# Patient Record
Sex: FEMALE | Race: WHITE | NOT HISPANIC OR LATINO | Employment: UNEMPLOYED | URBAN - METROPOLITAN AREA
[De-identification: names, ages, dates, MRNs, and addresses within clinical notes are randomized per-mention and may not be internally consistent; named-entity substitution may affect disease eponyms.]

---

## 2017-02-07 ENCOUNTER — HOSPITAL ENCOUNTER (OUTPATIENT)
Dept: LAB | Facility: HOSPITAL | Age: 22
Setting detail: SPECIMEN
Discharge: HOME | End: 2017-02-07
Attending: NURSE PRACTITIONER | Admitting: NURSE PRACTITIONER

## 2017-02-07 LAB
ALBUMIN SERPL-MCNC: 3.9 G/DL (ref 3.5–4.8)
ALBUMIN/GLOB SERPL: 1.3 {RATIO} (ref 1–1.7)
ALP SERPL-CCNC: 94 IU/L (ref 32–91)
ALT SERPL-CCNC: 18 IU/L (ref 14–54)
ANION GAP SERPL CALC-SCNC: 11.5 MMOL/L (ref 10–20)
AST SERPL-CCNC: 23 IU/L (ref 15–41)
BILIRUB SERPL-MCNC: 0.4 MG/DL (ref 0.3–1.2)
BUN SERPL-MCNC: 6 MG/DL (ref 8–20)
BUN/CREAT SERPL: 12 (ref 5.4–26.2)
CALCIUM SERPL-MCNC: 9.3 MG/DL (ref 8.9–10.3)
CHLORIDE SERPL-SCNC: 102 MMOL/L (ref 101–111)
CONV CO2: 27 MMOL/L (ref 22–32)
CONV TOTAL PROTEIN: 7 G/DL (ref 6.1–7.9)
CREAT UR-MCNC: 0.5 MG/DL (ref 0.4–1)
GLOBULIN UR ELPH-MCNC: 3.1 G/DL (ref 2.5–3.8)
GLUCOSE SERPL-MCNC: 200 MG/DL (ref 65–99)
POTASSIUM SERPL-SCNC: 3.5 MMOL/L (ref 3.6–5.1)
SODIUM SERPL-SCNC: 137 MMOL/L (ref 136–144)

## 2018-03-22 ENCOUNTER — HOSPITAL ENCOUNTER (OUTPATIENT)
Dept: LAB | Facility: HOSPITAL | Age: 23
Setting detail: SPECIMEN
Discharge: HOME | End: 2018-03-22
Attending: INTERNAL MEDICINE | Admitting: INTERNAL MEDICINE

## 2018-07-25 ENCOUNTER — HOSPITAL ENCOUNTER (OUTPATIENT)
Dept: LAB | Facility: HOSPITAL | Age: 23
Setting detail: SPECIMEN
Discharge: HOME | End: 2018-07-25
Attending: NURSE PRACTITIONER | Admitting: NURSE PRACTITIONER

## 2018-07-26 LAB
ALBUMIN SERPL-MCNC: 3.5 G/DL (ref 3.5–4.8)
ALBUMIN/GLOB SERPL: 1.1 {RATIO} (ref 1–1.7)
ALP SERPL-CCNC: 75 IU/L (ref 32–91)
ALT SERPL-CCNC: 10 IU/L (ref 14–54)
ANION GAP SERPL CALC-SCNC: 9.4 MMOL/L (ref 10–20)
AST SERPL-CCNC: 15 IU/L (ref 15–41)
BILIRUB SERPL-MCNC: 0.5 MG/DL (ref 0.3–1.2)
BUN SERPL-MCNC: 9 MG/DL (ref 8–20)
BUN/CREAT SERPL: 12.9 (ref 5.4–26.2)
CALCIUM SERPL-MCNC: 8.7 MG/DL (ref 8.9–10.3)
CHLORIDE SERPL-SCNC: 100 MMOL/L (ref 101–111)
CONV CO2: 28 MMOL/L (ref 22–32)
CONV TOTAL PROTEIN: 6.7 G/DL (ref 6.1–7.9)
CREAT UR-MCNC: 0.7 MG/DL (ref 0.4–1)
GLOBULIN UR ELPH-MCNC: 3.2 G/DL (ref 2.5–3.8)
GLUCOSE SERPL-MCNC: ABNORMAL MG/DL (ref 65–99)
POTASSIUM SERPL-SCNC: 4.4 MMOL/L (ref 3.6–5.1)
SODIUM SERPL-SCNC: 133 MMOL/L (ref 136–144)

## 2019-07-23 ENCOUNTER — TELEPHONE (OUTPATIENT)
Dept: ENDOCRINOLOGY | Facility: CLINIC | Age: 24
End: 2019-07-23

## 2019-07-23 NOTE — TELEPHONE ENCOUNTER
Patient last seen in January 22, 2019, was instructed to arrange follow-up in 3 months.  Has not seen this patient since then.  On that visit she did not bring any blood sugar records and her last lab I have is from July 2018 when her A1c was 10.7% and serum glucose was 590 and CMP.  At this time I do not have enough information to provide any kind of clearance guidance with regards to surgery.  She will need follow-up, she needs to bring blood sugar records and will need A1c, CMP before we can assess her.  Thank you

## 2019-07-23 NOTE — TELEPHONE ENCOUNTER
"Pt's dentist is requesting medical clearance. The fax states to \"please consider the following treatment plan: 23 year old white female with muliple carious teeth requiring extraction. Patient would like to have IV sedation for the procedure. I would like to know if she is under good control of her diabetes prior to surgery. Normally a patient is NPO 8 hours prior to surgery. We ask them not tot take their insulin that morning until we check their blood sugar prior to surgery. If necessary, patient can use a sliding scale if the blood sugar is too high. Please advise if there are any contraindications to our proposed procedure. \"    I have printed the patient's most recent office visit (01/2019) along with med list and most recent a1c, from [facility], and have placed them on your desk.  "

## 2019-08-22 RX ORDER — INSULIN GLARGINE 100 [IU]/ML
INJECTION, SOLUTION SUBCUTANEOUS
Qty: 15 ML | Refills: 3 | Status: SHIPPED | OUTPATIENT
Start: 2019-08-22 | End: 2020-10-29 | Stop reason: SDUPTHER

## 2019-11-06 PROBLEM — E11.42 DIABETIC PERIPHERAL NEUROPATHY (HCC): Status: ACTIVE | Noted: 2017-01-09

## 2019-11-06 PROBLEM — Z91.199 PATIENT'S NONCOMPLIANCE WITH OTHER MEDICAL TREATMENT AND REGIMEN: Status: ACTIVE | Noted: 2017-01-09

## 2019-11-06 PROBLEM — Z83.3 FAMILY HISTORY OF DIABETES MELLITUS: Status: ACTIVE | Noted: 2019-11-06

## 2019-11-06 RX ORDER — LANCETS
EACH MISCELLANEOUS
COMMUNITY
Start: 2019-01-22 | End: 2020-12-14 | Stop reason: SDUPTHER

## 2019-11-11 ENCOUNTER — TELEPHONE (OUTPATIENT)
Dept: ENDOCRINOLOGY | Facility: CLINIC | Age: 24
End: 2019-11-11

## 2020-06-24 RX ORDER — INSULIN ASPART 100 [IU]/ML
INJECTION, SOLUTION INTRAVENOUS; SUBCUTANEOUS
Refills: 4 | OUTPATIENT
Start: 2020-06-24

## 2020-06-25 RX ORDER — INSULIN ASPART 100 [IU]/ML
INJECTION, SOLUTION INTRAVENOUS; SUBCUTANEOUS
Refills: 4 | OUTPATIENT
Start: 2020-06-25

## 2020-10-29 ENCOUNTER — LAB (OUTPATIENT)
Dept: LAB | Facility: HOSPITAL | Age: 25
End: 2020-10-29

## 2020-10-29 ENCOUNTER — OFFICE VISIT (OUTPATIENT)
Dept: ENDOCRINOLOGY | Facility: CLINIC | Age: 25
End: 2020-10-29

## 2020-10-29 VITALS
HEIGHT: 59 IN | TEMPERATURE: 97.1 F | SYSTOLIC BLOOD PRESSURE: 100 MMHG | WEIGHT: 83 LBS | BODY MASS INDEX: 16.73 KG/M2 | DIASTOLIC BLOOD PRESSURE: 65 MMHG | HEART RATE: 117 BPM | OXYGEN SATURATION: 97 %

## 2020-10-29 DIAGNOSIS — E10.65 TYPE 1 DIABETES MELLITUS WITH HYPERGLYCEMIA (HCC): Primary | ICD-10-CM

## 2020-10-29 DIAGNOSIS — E10.65 TYPE 1 DIABETES MELLITUS WITH HYPERGLYCEMIA (HCC): ICD-10-CM

## 2020-10-29 DIAGNOSIS — E11.42 DIABETIC PERIPHERAL NEUROPATHY (HCC): ICD-10-CM

## 2020-10-29 DIAGNOSIS — Z91.199 PATIENT'S NONCOMPLIANCE WITH OTHER MEDICAL TREATMENT AND REGIMEN: ICD-10-CM

## 2020-10-29 DIAGNOSIS — R73.9 HYPERGLYCEMIA: ICD-10-CM

## 2020-10-29 LAB
ALBUMIN SERPL-MCNC: 3.9 G/DL (ref 3.5–5.2)
ALBUMIN/GLOB SERPL: 1.3 G/DL
ALP SERPL-CCNC: 77 U/L (ref 39–117)
ALT SERPL W P-5'-P-CCNC: 11 U/L (ref 1–33)
ANION GAP SERPL CALCULATED.3IONS-SCNC: 9.7 MMOL/L (ref 5–15)
AST SERPL-CCNC: 13 U/L (ref 1–32)
BILIRUB SERPL-MCNC: 0.7 MG/DL (ref 0–1.2)
BUN SERPL-MCNC: 12 MG/DL (ref 6–20)
BUN/CREAT SERPL: 16.7 (ref 7–25)
CALCIUM SPEC-SCNC: 9.2 MG/DL (ref 8.6–10.5)
CHLORIDE SERPL-SCNC: 91 MMOL/L (ref 98–107)
CHOLEST SERPL-MCNC: 113 MG/DL (ref 0–200)
CO2 SERPL-SCNC: 23.3 MMOL/L (ref 22–29)
CREAT SERPL-MCNC: 0.72 MG/DL (ref 0.57–1)
GFR SERPL CREATININE-BSD FRML MDRD: 100 ML/MIN/1.73
GLOBULIN UR ELPH-MCNC: 2.9 GM/DL
GLUCOSE BLDC GLUCOMTR-MCNC: 0 MG/DL (ref 70–130)
GLUCOSE BLDC GLUCOMTR-MCNC: >500 MG/DL (ref 70–105)
GLUCOSE SERPL-MCNC: 739 MG/DL (ref 65–99)
HBA1C MFR BLD: 11.3 % (ref 3.5–5.6)
HDLC SERPL-MCNC: 42 MG/DL (ref 40–60)
LDLC SERPL CALC-MCNC: 48 MG/DL (ref 0–100)
LDLC/HDLC SERPL: 1.06 {RATIO}
POTASSIUM SERPL-SCNC: 4.8 MMOL/L (ref 3.5–5.2)
PROT SERPL-MCNC: 6.8 G/DL (ref 6–8.5)
SODIUM SERPL-SCNC: 124 MMOL/L (ref 136–145)
TRIGL SERPL-MCNC: 133 MG/DL (ref 0–150)
TSH SERPL DL<=0.05 MIU/L-ACNC: 4.2 UIU/ML (ref 0.27–4.2)
VLDLC SERPL-MCNC: 23 MG/DL (ref 5–40)

## 2020-10-29 PROCEDURE — 82962 GLUCOSE BLOOD TEST: CPT | Performed by: INTERNAL MEDICINE

## 2020-10-29 PROCEDURE — 80053 COMPREHEN METABOLIC PANEL: CPT

## 2020-10-29 PROCEDURE — 84443 ASSAY THYROID STIM HORMONE: CPT

## 2020-10-29 PROCEDURE — 83036 HEMOGLOBIN GLYCOSYLATED A1C: CPT

## 2020-10-29 PROCEDURE — 36415 COLL VENOUS BLD VENIPUNCTURE: CPT

## 2020-10-29 PROCEDURE — 80061 LIPID PANEL: CPT

## 2020-10-29 PROCEDURE — 99214 OFFICE O/P EST MOD 30 MIN: CPT | Performed by: INTERNAL MEDICINE

## 2020-10-29 RX ORDER — MAGNESIUM 200 MG
1000 TABLET ORAL DAILY
Qty: 100 EACH | Refills: 4 | Status: SHIPPED | OUTPATIENT
Start: 2020-10-29 | End: 2020-12-14

## 2020-10-29 RX ORDER — ERGOCALCIFEROL (VITAMIN D2) 50 MCG
2000 CAPSULE ORAL DAILY
Qty: 100 CAPSULE | Refills: 4 | Status: SHIPPED | OUTPATIENT
Start: 2020-10-29 | End: 2020-12-14

## 2020-10-29 RX ORDER — IBUPROFEN 600 MG/1
1 TABLET ORAL ONCE AS NEEDED
Qty: 2 KIT | Refills: 6 | Status: SHIPPED | OUTPATIENT
Start: 2020-10-29 | End: 2022-02-01 | Stop reason: SDUPTHER

## 2020-10-29 RX ORDER — FLUTICASONE PROPIONATE 50 MCG
SPRAY, SUSPENSION (ML) NASAL
COMMUNITY
Start: 2020-07-24 | End: 2020-10-29

## 2020-10-29 RX ORDER — AMOXICILLIN AND CLAVULANATE POTASSIUM 250; 62.5 MG/5ML; MG/5ML
POWDER, FOR SUSPENSION ORAL
COMMUNITY
Start: 2020-07-24 | End: 2020-10-29

## 2020-10-29 RX ORDER — INSULIN GLARGINE 100 [IU]/ML
15 INJECTION, SOLUTION SUBCUTANEOUS NIGHTLY
Qty: 15 ML | Refills: 3 | Status: SHIPPED | OUTPATIENT
Start: 2020-10-29 | End: 2020-12-14 | Stop reason: SDUPTHER

## 2020-10-29 RX ORDER — INSULIN LISPRO 100 U/ML
INJECTION, SOLUTION SUBCUTANEOUS
Qty: 5 PEN | Refills: 6 | Status: SHIPPED | OUTPATIENT
Start: 2020-10-29 | End: 2021-10-25 | Stop reason: SDUPTHER

## 2020-10-29 NOTE — PATIENT INSTRUCTIONS
Change Basaglar to 15 units subcu nightly  Start Admelog 4 units with each meal  Always keep glucose source in case of low blood sugar  Start vitamin B12 1000 mcg sublingual daily  Labs today  Annual eye exam  Please keep your appointments  Follow-up in 3 months.

## 2020-10-29 NOTE — PROGRESS NOTES
Endocrine Progress Note Outpatient     Patient Care Team:  Antoinette Mcmahon MD as PCP - General    Chief Complaint: Follow-up type 1 diabetes: She is accompanied today with her mother.    HPI: 25-year-old female with history of type 1 diabetes for last 6 to 7 years, last seen in January 2019.  She is now here for follow-up because she ran out of her one of her insulin.  She tells me that she is taking Basaglar 20 units at bedtime and NovoLog on a sliding scale between 5 to 10 units with each meal but she has not taken NovoLog for the last couple of weeks.  She tells me that she checks blood sugars at least 3 times a day and runs high most of the time but she occasionally does have low blood sugar.  This last seen in January 2019 she has not been hospitalized for high or low blood sugars.  Unfortunately I do not have any blood sugar records for review and she has not done any labs.  She tells me that she has done diabetes education in the past but she does not know when it was maybe about a year ago or so.  She tells me that she does have glucose source in case of low blood sugar.    Diabetic neuropathy: She does have some burning in the tingling of her feet which is not taking any diabetic neuropathy medications at this time.    Past Medical History:   Diagnosis Date   • Diabetes mellitus type I (CMS/[facility])        Social History     Socioeconomic History   • Marital status: Single     Spouse name: Not on file   • Number of children: Not on file   • Years of education: Not on file   • Highest education level: Not on file   Tobacco Use   • Smoking status: Never Smoker   • Smokeless tobacco: Never Used   Substance and Sexual Activity   • Alcohol use: Never     Frequency: Never   • Drug use: Defer       History reviewed. No pertinent family history.    No Known Allergies    ROS:   Constitutional:  Denies fatigue, tiredness.    Eyes:  Denies change in visual acuity   HENT:  Denies nasal congestion or sore throat    Respiratory: denies cough, shortness of breath.   Cardiovascular:  denies chest pain, edema   GI:  Denies abdominal pain, nausea, vomiting.   Musculoskeletal:  Denies back pain or joint pain   Integument:  Denies dry skin and rash   Neurologic:  Denies headache, focal weakness or sensory changes   Endocrine:  Denies polyuria or polydipsia   Psychiatric:  Denies depression or anxiety      Vitals:    10/29/20 1134   BP: 100/65   Pulse: 117   Temp: 97.1 °F (36.2 °C)   SpO2: 97%       Physical Exam:  GEN: NAD, conversant  EYES: EOMI, PERRL, no conjunctival erythema  NECK: no thyromegaly, full ROM   CV: RRR, no murmurs/rubs/gallops, no peripheral edema  LUNG: CTAB, no wheezes/rales/ronchi  SKIN: no rashes, no acanthosis  MSK: no deformities, full ROM of all extremities  NEURO: no tremors, DTR normal  PSYCH: AOX3, appropriate mood, affect normal      Results Review:     I reviewed the patient's new clinical results.      Lab Results   Component Value Date    GLUCOSE (C) 07/25/2018     590 Result checked, called, and read back. ELDON (KEDAR) [id number].    BUN 9 07/25/2018    CREATININE 0.7 07/25/2018    BCR 12.9 07/25/2018    K 4.4 07/25/2018    CO2 28 07/25/2018    CALCIUM 8.7 (L) 07/25/2018    ALBUMIN 3.5 07/25/2018    LABIL2 1.1 07/25/2018    AST 15 07/25/2018    ALT 10 (L) 07/25/2018         Medication Review: Reviewed.       Current Outpatient Medications:   •  ACCU-CHEK FASTCLIX LANCETS misc, ACCU-CHEK FASTCLIX LANCETS, Disp: , Rfl:   •  glucagon (GLUCAGON EMERGENCY) 1 MG injection, GLUCAGON EMERGENCY 1 MG KIT, Disp: , Rfl:   •  glucose blood (ACCU-CHEK JULIAN PLUS) test strip, ACCU-CHEK JULIAN PLUS STRP, Disp: , Rfl:   •  Insulin Glargine (BASAGLAR KWIKPEN) 100 UNIT/ML injection pen, Inject 20 units at bedtime, Disp: 15 mL, Rfl: 3  •  Insulin Pen Needle (B-D UF III MINI PEN NEEDLES) 31G X 5 MM misc, BD PEN NEEDLE MINI U/F 31G X 5 MM, Disp: , Rfl:   •  insulin aspart (NOVOLOG FLEXPEN) 100 UNIT/ML solution  pen-injector sc pen, NOVOLOG FLEXPEN 100 UNIT/ML SOPN, Disp: , Rfl:       Assessment/Plan   1.  Diabetes mellitus type 1: Uncontrolled with significant hyperglycemia.  At this time I will add Admelog about 5 units with each meal and she will continue Basaglar but I will reduce the dose to 15 units subcu daily.  She is advised to check blood sugars at least 3 times a day and bring records for review.  She is also advised to always keep glucose source in case of low blood sugar and keep appointments.  She is accompanied today with her mother.  2.  Diabetic peripheral neuropathy: We will add vitamin B12 supplementation  3.  Noncompliance: She understands diabetes complications.            Landon De Luna MD FACE.

## 2020-10-30 ENCOUNTER — TELEPHONE (OUTPATIENT)
Dept: ENDOCRINOLOGY | Facility: CLINIC | Age: 25
End: 2020-10-30

## 2020-10-30 NOTE — TELEPHONE ENCOUNTER
Got called from the hospital yesterday with regards to high blood sugars, chart reviewed.  Called patient on October 29, 2020 in the evening.  No answer and message left for her to call back.

## 2020-11-06 DIAGNOSIS — E10.65 TYPE 1 DIABETES MELLITUS WITH HYPERGLYCEMIA (HCC): Primary | ICD-10-CM

## 2020-11-09 RX ORDER — LEVOTHYROXINE SODIUM 0.03 MG/1
25 TABLET ORAL DAILY
Qty: 30 TABLET | Refills: 3 | Status: SHIPPED | OUTPATIENT
Start: 2020-11-09 | End: 2021-03-10 | Stop reason: SDUPTHER

## 2020-11-11 DIAGNOSIS — Z91.199 PATIENT'S NONCOMPLIANCE WITH OTHER MEDICAL TREATMENT AND REGIMEN: Primary | ICD-10-CM

## 2020-11-11 DIAGNOSIS — E10.65 TYPE 1 DIABETES MELLITUS WITH HYPERGLYCEMIA (HCC): ICD-10-CM

## 2020-11-12 NOTE — TELEPHONE ENCOUNTER
Attempted to call both phone numbers, no answer.  
Patient notified. She has not been in contact with insurance to see what is covered instead of Glucagon. Advised patient it is important to have that on hand and to please call me as soon as she knows so we can get it sent to the pharmacy. Pt states she cannot take pills, so unsure of how to proceed with levothyroxine as I do not think it comes in liquid form. Please advise. Lab orders will be mailed to patient and Kathrine will schedule follow up appointment.   
Patient will contact insurance to see what is covered, pharmacy did not know. Patient also wanted you to know she is 6 weeks pregnant and wants to know if she should do anything differently.   
Per [facility], Glucagon is not covered by insurance, please advise.  
Plz see if what insurance will cover?   
She can crush levothyroxine pills and take it in applesauce or something.  
She has uncontrolled type 1 diabetes with very high blood sugars.  She is high risk pregnancy with high risk for fetal malformations and miscarriage.  She needs to see high risk pregnancy people right away.  She needs to take her insulins on regular basis.  She needs to check her blood sugars at least 4 times a day fasting in the morning and 2 hours after each meal and send blood sugar weekly for review.  She needs to keep glucose source in case of low blood sugar she needs to follow diet.    Also her TSH is high normal, I will add levothyroxine 25 mcg p.o. daily.    Check TSH and free T4 along with A1c in 4 weeks and follow-up after labs.    Most importantly needs to follow-up with high risk pregnancy right away.  
Spoke to a family member, patient was asleep but they took number and will have her call when she wakes up.  
vertigo, gastro/Medications

## 2020-12-14 ENCOUNTER — OFFICE VISIT (OUTPATIENT)
Dept: ENDOCRINOLOGY | Facility: CLINIC | Age: 25
End: 2020-12-14

## 2020-12-14 VITALS
WEIGHT: 91 LBS | DIASTOLIC BLOOD PRESSURE: 65 MMHG | OXYGEN SATURATION: 98 % | BODY MASS INDEX: 18.35 KG/M2 | HEART RATE: 118 BPM | HEIGHT: 59 IN | TEMPERATURE: 97.1 F | SYSTOLIC BLOOD PRESSURE: 100 MMHG

## 2020-12-14 DIAGNOSIS — E03.9 ACQUIRED HYPOTHYROIDISM: ICD-10-CM

## 2020-12-14 DIAGNOSIS — E10.65 TYPE 1 DIABETES MELLITUS WITH HYPERGLYCEMIA (HCC): Primary | ICD-10-CM

## 2020-12-14 DIAGNOSIS — E11.42 DIABETIC PERIPHERAL NEUROPATHY (HCC): ICD-10-CM

## 2020-12-14 DIAGNOSIS — Z91.199 PATIENT'S NONCOMPLIANCE WITH OTHER MEDICAL TREATMENT AND REGIMEN: ICD-10-CM

## 2020-12-14 LAB — GLUCOSE BLDC GLUCOMTR-MCNC: 56 MG/DL (ref 70–105)

## 2020-12-14 PROCEDURE — 99214 OFFICE O/P EST MOD 30 MIN: CPT | Performed by: INTERNAL MEDICINE

## 2020-12-14 PROCEDURE — 82962 GLUCOSE BLOOD TEST: CPT | Performed by: INTERNAL MEDICINE

## 2020-12-14 RX ORDER — LANCETS
EACH MISCELLANEOUS
Qty: 100 EACH | Refills: 6 | Status: SHIPPED | OUTPATIENT
Start: 2020-12-14 | End: 2021-11-24 | Stop reason: SDUPTHER

## 2020-12-14 RX ORDER — INSULIN GLARGINE 100 [IU]/ML
13 INJECTION, SOLUTION SUBCUTANEOUS NIGHTLY
Qty: 15 ML | Refills: 3 | Status: SHIPPED | OUTPATIENT
Start: 2020-12-14 | End: 2021-04-26 | Stop reason: SDUPTHER

## 2020-12-14 RX ORDER — BLOOD SUGAR DIAGNOSTIC
STRIP MISCELLANEOUS
Qty: 100 EACH | Refills: 12 | Status: SHIPPED | OUTPATIENT
Start: 2020-12-14 | End: 2021-11-24 | Stop reason: SDUPTHER

## 2020-12-14 NOTE — PATIENT INSTRUCTIONS
Please stop smoking  Please take vitamin D 2000 units p.o. daily  Please take vitamin B12 1000 mcg sublingual daily  Decrease Basaglar to 13 units at night  Always keep glucose source in case of low blood sugar  Annual eye exam and flu vaccine  Labs before follow-up.

## 2021-03-10 RX ORDER — LEVOTHYROXINE SODIUM 0.03 MG/1
TABLET ORAL
Qty: 30 TABLET | Refills: 8 | Status: SHIPPED | OUTPATIENT
Start: 2021-03-10

## 2021-04-26 ENCOUNTER — OFFICE VISIT (OUTPATIENT)
Dept: ENDOCRINOLOGY | Facility: CLINIC | Age: 26
End: 2021-04-26

## 2021-04-26 VITALS
HEART RATE: 87 BPM | DIASTOLIC BLOOD PRESSURE: 65 MMHG | HEIGHT: 59 IN | SYSTOLIC BLOOD PRESSURE: 95 MMHG | WEIGHT: 87 LBS | BODY MASS INDEX: 17.54 KG/M2 | OXYGEN SATURATION: 97 % | TEMPERATURE: 97.7 F

## 2021-04-26 DIAGNOSIS — E03.9 ACQUIRED HYPOTHYROIDISM: ICD-10-CM

## 2021-04-26 DIAGNOSIS — E11.42 DIABETIC PERIPHERAL NEUROPATHY (HCC): ICD-10-CM

## 2021-04-26 DIAGNOSIS — E10.65 TYPE 1 DIABETES MELLITUS WITH HYPERGLYCEMIA (HCC): Primary | ICD-10-CM

## 2021-04-26 LAB — GLUCOSE BLDC GLUCOMTR-MCNC: 393 MG/DL (ref 70–105)

## 2021-04-26 PROCEDURE — 82962 GLUCOSE BLOOD TEST: CPT | Performed by: INTERNAL MEDICINE

## 2021-04-26 PROCEDURE — 99214 OFFICE O/P EST MOD 30 MIN: CPT | Performed by: INTERNAL MEDICINE

## 2021-04-26 RX ORDER — CHOLECALCIFEROL (VITAMIN D3) 125 MCG
CAPSULE ORAL
COMMUNITY
Start: 2021-03-13 | End: 2022-01-14

## 2021-04-26 RX ORDER — PROCHLORPERAZINE 25 MG/1
SUPPOSITORY RECTAL
Qty: 2 EACH | Refills: 6 | Status: SHIPPED | OUTPATIENT
Start: 2021-04-26 | End: 2022-12-05 | Stop reason: SDUPTHER

## 2021-04-26 RX ORDER — PROCHLORPERAZINE 25 MG/1
1 SUPPOSITORY RECTAL DAILY
Qty: 1 EACH | Refills: 0 | Status: SHIPPED | OUTPATIENT
Start: 2021-04-26 | End: 2022-12-05 | Stop reason: SDUPTHER

## 2021-04-26 RX ORDER — PROCHLORPERAZINE 25 MG/1
1 SUPPOSITORY RECTAL DAILY
Qty: 1 EACH | Refills: 1 | Status: SHIPPED | OUTPATIENT
Start: 2021-04-26

## 2021-04-26 RX ORDER — INSULIN GLARGINE 100 [IU]/ML
14 INJECTION, SOLUTION SUBCUTANEOUS NIGHTLY
Qty: 15 ML | Refills: 3 | Status: SHIPPED | OUTPATIENT
Start: 2021-04-26 | End: 2022-05-03

## 2021-04-26 NOTE — PATIENT INSTRUCTIONS
Change Basaglar to 14 units subcu nightly  Continue rest of the medication  Use Dexcom monitoring system  Always keep glucose source in case of low blood sugar  Annual eye exam and flu vaccine  Labs before follow-up

## 2021-04-26 NOTE — PROGRESS NOTES
Endocrine Progress Note Outpatient     Patient Care Team:  Antoinette Mcmahon MD as PCP - General    Chief Complaint: Follow-up type 1 diabetes: She is accompanied today with her mother.    HPI: 25-year-old female with history of type 1 diabetes for last 6 to 7 years,, hypothyroidism and noncompliance is here for follow-up.    For type 1 diabetes: She is now on Basaglar 13 units at bedtime and Admelog 5 with each meal.  She did bring in her glucometer which I reviewed she is having some low blood sugars especially in the morning.  She has not required any assistance or hospitalization for either high or low blood sugar since last seen in October 2020.      Hypothyroidism: On levothyroxine supplementation.    Diabetic neuropathy: She does have some burning in the tingling of her feet which is not taking any diabetic neuropathy medications at this time.  She was prescribed vitamin D and B12 supplementation but her insurance would not pay and she cannot afford it.    Past Medical History:   Diagnosis Date   • Diabetes mellitus type I (CMS/[facility])        Social History     Socioeconomic History   • Marital status: Single     Spouse name: Not on file   • Number of children: Not on file   • Years of education: Not on file   • Highest education level: Not on file   Tobacco Use   • Smoking status: Former Smoker   • Smokeless tobacco: Never Used   Vaping Use   • Vaping Use: Never used   Substance and Sexual Activity   • Alcohol use: Never   • Drug use: Defer   • Sexual activity: Defer       History reviewed. No pertinent family history.    No Known Allergies    ROS:   Constitutional:  Denies fatigue, tiredness.    Eyes:  Denies change in visual acuity   HENT:  Denies nasal congestion or sore throat   Respiratory: denies cough, shortness of breath.   Cardiovascular:  denies chest pain, edema   GI:  Denies abdominal pain, nausea, vomiting.   Musculoskeletal:  Denies back pain or joint pain   Integument:  Denies dry skin and rash    Neurologic:  Denies headache, focal weakness or sensory changes   Endocrine:  Denies polyuria or polydipsia   Psychiatric:  Denies depression or anxiety      Vitals:    04/26/21 1255   BP: 95/65   Pulse: 87   Temp: 97.7 °F (36.5 °C)   SpO2: 97%       Physical Exam:  GEN: NAD, conversant  EYES: EOMI, PERRL, no conjunctival erythema  NECK: no thyromegaly, full ROM   CV: RRR, no murmurs/rubs/gallops, no peripheral edema  LUNG: CTAB, no wheezes/rales/ronchi  SKIN: no rashes, no acanthosis  MSK: no deformities, full ROM of all extremities  NEURO: no tremors, DTR normal  PSYCH: AOX3, appropriate mood, affect normal      Results Review:     I reviewed the patient's new clinical results.    Labs from April 19, 2021 showed a sodium 142, potassium 3.7, chloride 108, CO2 29, glucose 170, BUN 16, creatinine 0.56, AST 12, ALT 8, LDL 38, triglycerides 81, A1c 10.1, TSH 2.8, free T4 0.8 and albumin creatinine ratio was 31.      Medication Review: Reviewed.       Current Outpatient Medications:   •  Accu-Chek FastClix Lancets misc, Used to check blood sugars 4 times a day, Disp: 100 each, Rfl: 6  •  B-12, Methylcobalamin, 1000 MCG sublingual tablet, PLACE 1 TABLET UNDER THE TONGUE DAILY., Disp: , Rfl:   •  glucagon (Glucagon Emergency) 1 MG injection, Inject 1 mg under the skin into the appropriate area as directed 1 (One) Time As Needed for Low Blood Sugar for up to 1 dose., Disp: 2 kit, Rfl: 6  •  glucose blood (Accu-Chek Guide) test strip, Check blood sugars 3 times per day, Disp: 100 each, Rfl: 12  •  Insulin Glargine (BASAGLAR KWIKPEN) 100 UNIT/ML injection pen, Inject 13 Units under the skin into the appropriate area as directed Every Night., Disp: 15 mL, Rfl: 3  •  Insulin Lispro (ADMELOG SOLOSTAR) 100 UNIT/ML injection pen, Inject 4 units with each meal, Disp: 5 pen, Rfl: 6  •  Insulin Pen Needle (B-D UF III MINI PEN NEEDLES) 31G X 5 MM misc, BD PEN NEEDLE MINI U/F 31G X 5 MM, Disp: , Rfl:   •  levothyroxine (SYNTHROID,  LEVOTHROID) 25 MCG tablet, TAKE 1 TABLET BY MOUTH EVERY DAY, Disp: 30 tablet, Rfl: 8      Assessment/Plan   1.  Diabetes mellitus type 1: Uncontrolled with A1c of 10.1%, will increase Basaglar to 14 units at night and continue Admelog 5 with each meal.  She will benefit from Dexcom CGM S, will send prescription.  She is advised to always keep glucose source in case of low blood sugar and get annual eye exam and flu vaccine.  He verbalized understanding.  She is not interested in insulin pump at this time.    2.  Diabetic peripheral neuropathy: On vitamin D and B12 supplementation.    3.  Noncompliance: She understands diabetes complications.    4.  Hypothyroidism: On levothyroxine supplementation and last TSH was normal.            Landon De Luna MD FACE.

## 2021-04-30 ENCOUNTER — TELEPHONE (OUTPATIENT)
Dept: ENDOCRINOLOGY | Facility: CLINIC | Age: 26
End: 2021-04-30

## 2021-05-04 ENCOUNTER — TELEPHONE (OUTPATIENT)
Dept: ENDOCRINOLOGY | Facility: CLINIC | Age: 26
End: 2021-05-04

## 2021-09-07 ENCOUNTER — TELEPHONE (OUTPATIENT)
Dept: ENDOCRINOLOGY | Facility: CLINIC | Age: 26
End: 2021-09-07

## 2021-10-25 ENCOUNTER — OFFICE VISIT (OUTPATIENT)
Dept: ENDOCRINOLOGY | Facility: CLINIC | Age: 26
End: 2021-10-25

## 2021-10-25 VITALS
TEMPERATURE: 97.3 F | DIASTOLIC BLOOD PRESSURE: 65 MMHG | OXYGEN SATURATION: 99 % | SYSTOLIC BLOOD PRESSURE: 95 MMHG | HEART RATE: 108 BPM | WEIGHT: 87.4 LBS | BODY MASS INDEX: 17.62 KG/M2 | HEIGHT: 59 IN

## 2021-10-25 DIAGNOSIS — Z91.199 PATIENT'S NONCOMPLIANCE WITH OTHER MEDICAL TREATMENT AND REGIMEN: ICD-10-CM

## 2021-10-25 DIAGNOSIS — E10.65 TYPE 1 DIABETES MELLITUS WITH HYPERGLYCEMIA (HCC): Primary | ICD-10-CM

## 2021-10-25 DIAGNOSIS — E03.9 ACQUIRED HYPOTHYROIDISM: ICD-10-CM

## 2021-10-25 DIAGNOSIS — E11.42 DIABETIC PERIPHERAL NEUROPATHY (HCC): ICD-10-CM

## 2021-10-25 LAB — GLUCOSE BLDC GLUCOMTR-MCNC: >500 MG/DL (ref 70–105)

## 2021-10-25 PROCEDURE — 82962 GLUCOSE BLOOD TEST: CPT | Performed by: INTERNAL MEDICINE

## 2021-10-25 PROCEDURE — 99214 OFFICE O/P EST MOD 30 MIN: CPT | Performed by: INTERNAL MEDICINE

## 2021-10-25 RX ORDER — INSULIN LISPRO 100 U/ML
INJECTION, SOLUTION SUBCUTANEOUS
Qty: 5 PEN | Refills: 6 | Status: SHIPPED | OUTPATIENT
Start: 2021-10-25 | End: 2021-10-25 | Stop reason: SDUPTHER

## 2021-10-25 RX ORDER — INSULIN LISPRO 100 U/ML
INJECTION, SOLUTION SUBCUTANEOUS
Qty: 5 PEN | Refills: 6 | Status: SHIPPED | OUTPATIENT
Start: 2021-10-25 | End: 2022-05-03

## 2021-10-25 NOTE — PATIENT INSTRUCTIONS
Please increase Admelog to 7 units with each meal along with a sliding scale 1 unit for each 50 mg blood sugar over 150 at meals.  Continue Basaglar 20 units subcu daily  Please arrange consultation with diabetes educators for insulin pump and Dexcom.  Always keep glucose source in case of low blood sugar  Annual eye exam and flu vaccine  Labs in the next few days.

## 2021-10-25 NOTE — PROGRESS NOTES
Endocrine Progress Note Outpatient     Patient Care Team:  Antoinette Mcmahon MD as PCP - General    Chief Complaint: Follow-up type 1 diabetes:     HPI: 26-year-old female with history of type 1 diabetes for last 6 to 7 years,, hypothyroidism and noncompliance is here for follow-up.    For type 1 diabetes: She is now on Basaglar 20 units at bedtime and Admelog 5 with each meal.  She did not bring blood sugar records for review, she tells me her blood sugar runs up and down.  She was recently hospitalized at [facility] last week due to hypoglycemia, she was not in DKA but did require IV insulin.  She tells me that she does not miss her insulins, she is using unexpired insulins and is tolerating it properly.  No nausea or vomiting at this time.    Hypothyroidism: On levothyroxine supplementation.    Diabetic neuropathy: Is currently taking vitamin D and B12 supplementation.    Past Medical History:   Diagnosis Date   • Diabetes mellitus type I (HCC)        Social History     Socioeconomic History   • Marital status: Single   Tobacco Use   • Smoking status: Former Smoker   • Smokeless tobacco: Never Used   Vaping Use   • Vaping Use: Never used   Substance and Sexual Activity   • Alcohol use: Never   • Drug use: Defer   • Sexual activity: Defer       History reviewed. No pertinent family history.    No Known Allergies    ROS:   Constitutional:  Denies fatigue, tiredness.    Eyes:  Denies change in visual acuity   HENT:  Denies nasal congestion or sore throat   Respiratory: denies cough, shortness of breath.   Cardiovascular:  denies chest pain, edema   GI:  Denies abdominal pain, nausea, vomiting.   Musculoskeletal:  Denies back pain or joint pain   Integument:  Denies dry skin and rash   Neurologic:  Denies headache, focal weakness or sensory changes   Endocrine:  Denies polyuria or polydipsia   Psychiatric:  Denies depression or anxiety      Vitals:    10/25/21 1431   BP: 95/65   Pulse: 108   Temp: 97.3 °F  (36.3 °C)   SpO2: 99%       Physical Exam:  GEN: NAD, conversant  EYES: EOMI, PERRL, no conjunctival erythema  NECK: no thyromegaly, full ROM   CV: RRR, no murmurs/rubs/gallops, no peripheral edema  LUNG: CTAB, no wheezes/rales/ronchi  SKIN: no rashes, no acanthosis  MSK: no deformities, full ROM of all extremities  NEURO: no tremors, DTR normal  PSYCH: AOX3, appropriate mood, affect normal      Results Review:     I reviewed the patient's new clinical results.    Labs reviewed from her hospital visit showed a serum sodium of 126, potassium 4.4, chloride 95, CO2 22, anion gap was 9, BUN 11, creatinine 0.6, serum glucose was high at 463.  Ketones were negative.    Medication Review: Reviewed.       Current Outpatient Medications:   •  Accu-Chek FastClix Lancets misc, Used to check blood sugars 4 times a day, Disp: 100 each, Rfl: 6  •  B-12, Methylcobalamin, 1000 MCG sublingual tablet, PLACE 1 TABLET UNDER THE TONGUE DAILY., Disp: , Rfl:   •  Continuous Blood Gluc  (Dexcom G6 ) device, 1 Device Daily., Disp: 1 each, Rfl: 1  •  Continuous Blood Gluc Sensor (Dexcom G6 Sensor), Every 10 (Ten) Days., Disp: 2 each, Rfl: 6  •  Continuous Blood Gluc Transmit (Dexcom G6 Transmitter) misc, 1 Device Daily., Disp: 1 each, Rfl: 0  •  glucagon (Glucagon Emergency) 1 MG injection, Inject 1 mg under the skin into the appropriate area as directed 1 (One) Time As Needed for Low Blood Sugar for up to 1 dose., Disp: 2 kit, Rfl: 6  •  glucose blood (Accu-Chek Guide) test strip, Check blood sugars 3 times per day, Disp: 100 each, Rfl: 12  •  Insulin Glargine (BASAGLAR KWIKPEN) 100 UNIT/ML injection pen, Inject 14 Units under the skin into the appropriate area as directed Every Night., Disp: 15 mL, Rfl: 3  •  Insulin Lispro (ADMELOG SOLOSTAR) 100 UNIT/ML injection pen, Inject 4 units with each meal, Disp: 5 pen, Rfl: 6  •  Insulin Pen Needle (B-D UF III MINI PEN NEEDLES) 31G X 5 MM misc, BD PEN NEEDLE MINI U/F 31G X 5 MM,  Disp: , Rfl:   •  levothyroxine (SYNTHROID, LEVOTHROID) 25 MCG tablet, TAKE 1 TABLET BY MOUTH EVERY DAY, Disp: 30 tablet, Rfl: 8      Assessment/Plan   1.  Diabetes mellitus type 1: Uncontrolled with significant hyperglycemia, will continue Basaglar 20 units daily but increase Admelog to 7 with each meal along with a sliding scale 1 unit for each 50 mg blood sugar over 150 at meals of Admelog.  Refer her for diabetes education for insulin pump.  She tells me her insurance would not cover for Dexcom.  She is advised to always keep glucose source in case of low blood sugar and get annual eye exam and flu vaccine.  He verbalized understanding.  She is not interested in insulin pump at this time.    2.  Diabetic peripheral neuropathy: On vitamin D and B12 supplementation.    3.  Noncompliance: She understands diabetes complications.    4.  Hypothyroidism: On levothyroxine supplementation will follow TSH and free T4.            Landon De Luna MD FACE.    Addendum: 12/2/2021: Patient is on 4 insulin injections daily, she checks her blood sugars 4 times a day, she does have wide blood glucose excursions with twyla phenomenon with fasting glucose more than 200. She will benefit from insulin pump and continuous glucose monitoring system.

## 2021-11-08 ENCOUNTER — OFFICE VISIT (OUTPATIENT)
Dept: ENDOCRINOLOGY | Facility: CLINIC | Age: 26
End: 2021-11-08

## 2021-11-08 DIAGNOSIS — E10.65 TYPE 1 DIABETES MELLITUS WITH HYPERGLYCEMIA (HCC): ICD-10-CM

## 2021-11-08 PROCEDURE — G0108 DIAB MANAGE TRN  PER INDIV: HCPCS | Performed by: DIETITIAN, REGISTERED

## 2021-11-08 NOTE — PROGRESS NOTES
Spent 30 mins with pt for insulin pump and CGM eval. Pt attempted to get Dexcom supplies via [facility] in the past, but per her report it wasn't covered. With pt's insurance, [facility] may cover it now. She had decided on [employer]ipod insulin pump. Faxed ppw for Dexcom supplies to [facility] and faxed [employer] AOB form to [facility]. Scheduled carb counting MNT appt for 11/23/21 to review carb counting for pump.

## 2021-11-10 ENCOUNTER — TELEPHONE (OUTPATIENT)
Dept: ENDOCRINOLOGY | Facility: CLINIC | Age: 26
End: 2021-11-10

## 2021-11-12 ENCOUNTER — TELEPHONE (OUTPATIENT)
Dept: ENDOCRINOLOGY | Facility: CLINIC | Age: 26
End: 2021-11-12

## 2021-11-12 NOTE — TELEPHONE ENCOUNTER
ROSE MARIE hogan scanned into phone note.  Called pt and asked her to bring in at least 2 weeks of BG's showing that she is checking her BG's QID ac & hs. Afterward we can ask Dr. LOUIE to append last OV with any of the following that she meets:    4 insulin injections per day    Any repeated low BG's (<70)    Wide BG excursions    A1C >7%    Dolores phenomenon with FBG >200    Checking BG's at least 4 times daily    Completed DSME (pt has attended initial assessment and day class 1 in 2017)    And resubmit PA via [employer]

## 2021-11-23 ENCOUNTER — OFFICE VISIT (OUTPATIENT)
Dept: ENDOCRINOLOGY | Facility: CLINIC | Age: 26
End: 2021-11-23

## 2021-11-23 DIAGNOSIS — E10.65 UNCONTROLLED TYPE 1 DIABETES MELLITUS WITH HYPERGLYCEMIA (HCC): ICD-10-CM

## 2021-11-23 PROCEDURE — 97802 MEDICAL NUTRITION INDIV IN: CPT | Performed by: DIETITIAN, REGISTERED

## 2021-11-24 ENCOUNTER — TELEPHONE (OUTPATIENT)
Dept: ENDOCRINOLOGY | Facility: CLINIC | Age: 26
End: 2021-11-24

## 2021-11-24 DIAGNOSIS — E10.65 UNCONTROLLED TYPE 1 DIABETES MELLITUS WITH HYPERGLYCEMIA (HCC): Primary | ICD-10-CM

## 2021-11-24 RX ORDER — BLOOD SUGAR DIAGNOSTIC
STRIP MISCELLANEOUS
Qty: 100 EACH | Refills: 12 | Status: SHIPPED | OUTPATIENT
Start: 2021-11-24

## 2021-11-24 RX ORDER — LANCETS
EACH MISCELLANEOUS
Qty: 100 EACH | Refills: 6 | Status: SHIPPED | OUTPATIENT
Start: 2021-11-24

## 2021-11-24 NOTE — PROGRESS NOTES
Spent 45 mins with pt for individual MNT appt to learn how to carb count in order to use the insulin pump she is trying to get. Pt's mother was in attendance as well. Practiced using a ICR of 1:15 using examples of foods that pt frequently consumes. Once pt starts using an insulin pump, she will be carb counting for her pump. Pt also brought in 1 week of BG logs. Will forward to Ro[facility]i and pt will send more logs in case [employer] needs it. Pt also admitted that she has been drinking lots of regular root beer. Highly encouraged pt to decrease her intake of regular soda or to switch to diet.

## 2021-11-30 ENCOUNTER — TELEPHONE (OUTPATIENT)
Dept: ENDOCRINOLOGY | Facility: CLINIC | Age: 26
End: 2021-11-30

## 2021-12-09 ENCOUNTER — TELEPHONE (OUTPATIENT)
Dept: ENDOCRINOLOGY | Facility: CLINIC | Age: 26
End: 2021-12-09

## 2021-12-09 NOTE — TELEPHONE ENCOUNTER
When attempting to do a PA for pods via covermymeds this is the error message received:    Bruna Espinoza Nieto: [id number]   Outcome   Additional Information Required   The  is not the PA processor for this patient       Notified LOLI Benito with [employer]

## 2021-12-09 NOTE — TELEPHONE ENCOUNTER
Per LOLI Benito, deleted all open PA requests and started a new one for her pods and this is the message received from covermymeds:    [facility] is unable to respond with clinical questions.     Notified LOLI Benito

## 2021-12-16 ENCOUNTER — OFFICE VISIT (OUTPATIENT)
Dept: ENDOCRINOLOGY | Facility: CLINIC | Age: 26
End: 2021-12-16

## 2021-12-16 DIAGNOSIS — E10.65 TYPE 1 DIABETES MELLITUS WITH HYPERGLYCEMIA (HCC): ICD-10-CM

## 2021-12-16 PROCEDURE — G0108 DIAB MANAGE TRN  PER INDIV: HCPCS | Performed by: DIETITIAN, REGISTERED

## 2021-12-16 NOTE — PROGRESS NOTES
Trained pt on their Dexcom G6 continuous glucose monitor, including identification of the transmitter & sensor, difference between SG & BG, kimberly (or ) set up, alerts, proper sensor placement including skin preparation, review of steps needed for ending sensor sesstion & changing sensors, review of transmitter & sensor replacement frequency.  Alerts set today: , LOW 70  with repeat of  20  minutes.

## 2021-12-20 ENCOUNTER — TELEPHONE (OUTPATIENT)
Dept: ENDOCRINOLOGY | Facility: CLINIC | Age: 26
End: 2021-12-20

## 2021-12-20 NOTE — TELEPHONE ENCOUNTER
Was instructed per LOLI Benito with [employer] to submit PA with key code [id number]. On MD desk ready for signature. Once signed, will fax back to [employer].

## 2022-01-06 ENCOUNTER — TELEPHONE (OUTPATIENT)
Dept: ENDOCRINOLOGY | Facility: CLINIC | Age: 27
End: 2022-01-06

## 2022-01-10 ENCOUNTER — TELEPHONE (OUTPATIENT)
Dept: ENDOCRINOLOGY | Facility: CLINIC | Age: 27
End: 2022-01-10

## 2022-01-10 NOTE — TELEPHONE ENCOUNTER
[facility] now has kimberly to order supplies through [employer]. Pt was having needing information on how to get kimberly set up. Since this is brand new as of 2022, advised pt to call [facility] in assistance in setting up the kimberly.

## 2022-01-14 RX ORDER — CHOLECALCIFEROL (VITAMIN D3) 125 MCG
CAPSULE ORAL
Qty: 60 TABLET | Refills: 5 | Status: SHIPPED | OUTPATIENT
Start: 2022-01-14

## 2022-01-24 ENCOUNTER — TELEPHONE (OUTPATIENT)
Dept: ENDOCRINOLOGY | Facility: CLINIC | Age: 27
End: 2022-01-24

## 2022-01-24 NOTE — TELEPHONE ENCOUNTER
On MD desk for signatures and then will be faxed to [place]. Previous [employer] PA denial scanned into phone note.

## 2022-01-28 ENCOUNTER — TELEPHONE (OUTPATIENT)
Dept: ENDOCRINOLOGY | Facility: CLINIC | Age: 27
End: 2022-01-28

## 2022-01-28 DIAGNOSIS — E10.65 TYPE 1 DIABETES MELLITUS WITH HYPERGLYCEMIA: Primary | ICD-10-CM

## 2022-01-28 RX ORDER — INSULIN PUMP CONTROLLER
1 EACH MISCELLANEOUS CONTINUOUS
Qty: 10 EACH | Refills: 3 | Status: SHIPPED | OUTPATIENT
Start: 2022-01-28 | End: 2022-06-08

## 2022-01-28 RX ORDER — INSULIN PUMP CONTROLLER
EACH MISCELLANEOUS
COMMUNITY
End: 2022-01-28 | Stop reason: SDUPTHER

## 2022-02-01 ENCOUNTER — TELEPHONE (OUTPATIENT)
Dept: ENDOCRINOLOGY | Facility: CLINIC | Age: 27
End: 2022-02-01

## 2022-02-01 RX ORDER — INSULIN LISPRO 100 [IU]/ML
INJECTION, SOLUTION INTRAVENOUS; SUBCUTANEOUS
Qty: 20 ML | Refills: 3 | Status: SHIPPED | OUTPATIENT
Start: 2022-02-01 | End: 2022-06-06

## 2022-02-01 RX ORDER — INSULIN LISPRO 100 [IU]/ML
INJECTION, SOLUTION INTRAVENOUS; SUBCUTANEOUS
COMMUNITY
End: 2022-02-01 | Stop reason: SDUPTHER

## 2022-02-01 NOTE — TELEPHONE ENCOUNTER
Scheduled pump training. Rx for Admelog vial and Baqsimi sent to pharmacy per MD s/o. Also emailed mother at [email or web address] instructions for pump start.

## 2022-02-07 ENCOUNTER — TELEPHONE (OUTPATIENT)
Dept: ENDOCRINOLOGY | Facility: CLINIC | Age: 27
End: 2022-02-07

## 2022-02-07 NOTE — TELEPHONE ENCOUNTER
Pt called c/o she has not heard back from [facility] about her Dexcom supplies and requested a sample.  Called Aicha and she is approved for the AMT kimberly but her next shipment until March.  Left samples at front office for pt to p/u.

## 2022-02-16 ENCOUNTER — OFFICE VISIT (OUTPATIENT)
Dept: ENDOCRINOLOGY | Facility: CLINIC | Age: 27
End: 2022-02-16

## 2022-02-16 DIAGNOSIS — E10.65 TYPE 1 DIABETES MELLITUS WITH HYPERGLYCEMIA: ICD-10-CM

## 2022-02-16 NOTE — PROGRESS NOTES
No charge for pump patient.  Will be reimbursed by pump company. Stats, orders and checklists scanned into visit.

## 2022-03-03 ENCOUNTER — OFFICE VISIT (OUTPATIENT)
Dept: ENDOCRINOLOGY | Facility: CLINIC | Age: 27
End: 2022-03-03

## 2022-03-03 DIAGNOSIS — E10.65 TYPE 1 DIABETES MELLITUS WITH HYPERGLYCEMIA: ICD-10-CM

## 2022-03-03 NOTE — PROGRESS NOTES
Spent < 30 mins with pt, therefore will not be putting in a charge for today's visit. This was a follow up from her pump training. She c/o low BGs. Dexcom reports scanned into visit. Educator also reviewed pt's history in her pump. She has not been entering in g of carbs in her pump when bolusing. We reviewed using the food library in her PDM. She stated she usually eats rice most of the time. Educator and pt added rice as a favorite food in food library to help with carb counting. We also reviewed food labels and accurately measuring portions using measuring cups. Per MD s/o changed CF to 1:35. Pt is to have educators review Dexcom reports before the weekend for more possible adjustments. Educator and pt agreed that if she still has trouble with carb counting, that another education appt could be scheduled to review. She reported no other questions or concerns regarding the pump.

## 2022-03-07 ENCOUNTER — TELEPHONE (OUTPATIENT)
Dept: ENDOCRINOLOGY | Facility: CLINIC | Age: 27
End: 2022-03-07

## 2022-03-07 NOTE — TELEPHONE ENCOUNTER
Attempted to call pt but had to LVM to call back Madelyn. Wanted to check in with pt to see how she has been doing practicing carb counting since our appt last week and see if she needs to come in for additional appt to practice CHO counting.

## 2022-03-08 NOTE — TELEPHONE ENCOUNTER
Pt returned phone call and said that she has been working harder on carb counting. She will work on it for another few days and call if she needs to make an appt to re-review carb counting. She also said her PDM has been beeping but she doesn't have any active notifications/alarms showing. Advised that she call [employer] customer care or she could come to the office for one of the educators to look at the PDM. She stated she can't find any issues with the pod.

## 2022-03-14 ENCOUNTER — TELEPHONE (OUTPATIENT)
Dept: ENDOCRINOLOGY | Facility: CLINIC | Age: 27
End: 2022-03-14

## 2022-03-14 NOTE — TELEPHONE ENCOUNTER
Patient states she gets her Dexcom supplies from [facility]. She states she has not heard from them and she is on her last one. She states she has tried to contact them but they don't answer the phone and they don't return her calls. Is there somewhere else she can get her Dexcom supplies through, or do we need to send them a refill request?

## 2022-03-15 NOTE — TELEPHONE ENCOUNTER
To my knowledge, [facility] is the only that is covered by her insurance. She may check with her insurance to see if there are any other durable medical equipment companies that would be able to bill her insurance for Dexcom supplies. I believe she has mentioned this is an issue before. [phone] is the phone number we have for [facility]. I know that when I call for patients, I usually speak with Doug

## 2022-05-03 ENCOUNTER — OFFICE VISIT (OUTPATIENT)
Dept: ENDOCRINOLOGY | Facility: CLINIC | Age: 27
End: 2022-05-03

## 2022-05-03 VITALS
BODY MASS INDEX: 19.96 KG/M2 | OXYGEN SATURATION: 99 % | DIASTOLIC BLOOD PRESSURE: 70 MMHG | HEART RATE: 107 BPM | SYSTOLIC BLOOD PRESSURE: 125 MMHG | WEIGHT: 99 LBS | HEIGHT: 59 IN

## 2022-05-03 DIAGNOSIS — E11.42 DIABETIC PERIPHERAL NEUROPATHY: ICD-10-CM

## 2022-05-03 DIAGNOSIS — E03.9 ACQUIRED HYPOTHYROIDISM: ICD-10-CM

## 2022-05-03 DIAGNOSIS — E10.65 TYPE 1 DIABETES MELLITUS WITH HYPERGLYCEMIA: Primary | ICD-10-CM

## 2022-05-03 PROCEDURE — 99214 OFFICE O/P EST MOD 30 MIN: CPT | Performed by: INTERNAL MEDICINE

## 2022-05-03 PROCEDURE — 95251 CONT GLUC MNTR ANALYSIS I&R: CPT | Performed by: INTERNAL MEDICINE

## 2022-05-03 NOTE — PROGRESS NOTES
Endocrine Progress Note Outpatient     Patient Care Team:  Antoinette Mcmahon MD as PCP - General    Chief Complaint: Follow-up type 1 diabetes          HPI: This is a 26-year-old female with history of type 1 diabetes, hypothyroidism and diabetic neuropathy is here for follow-up.    For type 1 diabetes: She is currently using Omni pod insulin pump.  She also have Dexcom continuous glucose monitoring system.  Her current insulin pump settings are as follows basal rate from midnight to midnight is 0.45 units/h.  Her insulin carb ratios are 1 unit for each 14 g of carbohydrate, insulin correction factor is 1 unit for each 35 mg blood sugar with a target blood sugar of 1 20-1 40 and active insulin is 4 hours.    Hypothyroidism: Currently on levothyroxine supplementation    Diabetic neuropathy: Currently on vitamin D and B12 supplementation.    Past Medical History:   Diagnosis Date   • Diabetes mellitus type I (HCC)        Social History     Socioeconomic History   • Marital status: Single   • Number of children: 0   • Years of education: 9   Tobacco Use   • Smoking status: Former Smoker   • Smokeless tobacco: Never Used   Vaping Use   • Vaping Use: Never used   Substance and Sexual Activity   • Alcohol use: Never   • Drug use: Defer   • Sexual activity: Defer       History reviewed. No pertinent family history.    No Known Allergies    ROS:   Constitutional:  Denies fatigue, tiredness.    Eyes:  Denies change in visual acuity   HENT:  Denies nasal congestion or sore throat   Respiratory: denies cough, shortness of breath.   Cardiovascular:  denies chest pain, edema   GI:  Denies abdominal pain, nausea, vomiting.   Musculoskeletal:  Denies back pain or joint pain   Integument:  Denies dry skin and rash   Neurologic:  Denies headache, focal weakness or sensory changes   Endocrine:  Denies polyuria or polydipsia   Psychiatric:  Denies depression or anxiety      Vitals:    05/03/22 1512   BP: 125/70   Pulse: 107   SpO2:  99%     Body mass index is 20 kg/m².     Physical Exam:  GEN: NAD, conversant  EYES: EOMI, PERRL, no conjunctival erythema  NECK: no thyromegaly, full ROM   CV: RRR, no murmurs/rubs/gallops, no peripheral edema  LUNG: CTAB, no wheezes/rales/ronchi  SKIN: no rashes, no acanthosis  MSK: no deformities, full ROM of all extremities  NEURO: no tremors, DTR normal  PSYCH: AOX3, appropriate mood, affect normal      Results Review:     I reviewed the patient's new clinical results.    Lab Results   Component Value Date    HGBA1C 11.3 (H) 10/29/2020      Lab Results   Component Value Date    GLUCOSE 739 (C) 10/29/2020    BUN 12 10/29/2020    CREATININE 0.72 10/29/2020    EGFRIFNONA 100 10/29/2020    BCR 16.7 10/29/2020    K 4.8 10/29/2020    CO2 23.3 10/29/2020    CALCIUM 9.2 10/29/2020    ALBUMIN 3.90 10/29/2020    LABIL2 1.1 07/25/2018    AST 13 10/29/2020    ALT 11 10/29/2020    CHOL 113 10/29/2020    TRIG 133 10/29/2020    LDL 48 10/29/2020    HDL 42 10/29/2020     Lab Results   Component Value Date    TSH 4.200 10/29/2020     Dexcom download interpretation: Dates covered was between April 20, 2022 to May 3, 2022.  Average blood sugar was 268.  GMI is 9.7%.  She is spending 19% in the range and 80% above range.  She is using sensor 100% time.  Glucose graph showed significant hyperglycemia throughout the day.    Medication Review: Reviewed.       Current Outpatient Medications:   •  Accu-Chek FastClix Lancets misc, Used to check blood sugars 4 times a day, Disp: 100 each, Rfl: 6  •  acetone, urine, test strip, Use to check for ketones PRN if BG > 250 or if ill, Disp: 100 each, Rfl: 1  •  B-12, Methylcobalamin, 1000 MCG sublingual tablet, PLACE 1 TABLET UNDER THE TONGUE DAILY., Disp: 60 tablet, Rfl: 5  •  Continuous Blood Gluc  (Dexcom G6 ) device, 1 Device Daily., Disp: 1 each, Rfl: 1  •  Continuous Blood Gluc Sensor (Dexcom G6 Sensor), Every 10 (Ten) Days., Disp: 2 each, Rfl: 6  •  Continuous Blood Gluc  Transmit (Dexcom G6 Transmitter) misc, 1 Device Daily., Disp: 1 each, Rfl: 0  •  Glucagon 3 MG/DOSE powder, 1 each into the nostril(s) as directed by provider As Needed (prn for severe hypoglycemia). Use prn for severe hypoglycemia., Disp: 1 each, Rfl: 1  •  glucose blood (Accu-Chek Guide) test strip, Check blood sugars 3 times per day, Disp: 100 each, Rfl: 12  •  Insulin Disposable Pump (OmniPod Dash 5 Pack Pods) misc, 1 each Continuous. Pt to replace pod every 3 days., Disp: 10 each, Rfl: 3  •  Insulin Glargine (BASAGLAR KWIKPEN) 100 UNIT/ML injection pen, Inject 14 Units under the skin into the appropriate area as directed Every Night., Disp: 15 mL, Rfl: 3  •  Insulin Lispro (ADMELOG SOLOSTAR) 100 UNIT/ML injection pen, Inject 7 units with each meal sliding scale. MDD: 50 units per day, Disp: 5 pen, Rfl: 6  •  insulin lispro (ADMELOG) 100 UNIT/ML injection, Use in insulin pump as directed. MDD: 61 units, Disp: 20 mL, Rfl: 3  •  Insulin Pen Needle 31G X 5 MM misc, BD PEN NEEDLE MINI U/F 31G X 5 MM, Disp: , Rfl:   •  levothyroxine (SYNTHROID, LEVOTHROID) 25 MCG tablet, TAKE 1 TABLET BY MOUTH EVERY DAY, Disp: 30 tablet, Rfl: 8      Assessment/Plan   Diabetes mellitus type 1: Uncontrolled with significant hyperglycemia, will increase basal rate from midnight to midnight at 0.55 units/h.  Rest of the insulin pump settings will stay as it is.  We will check labs.  She is advised to always keep glucose source in case of low blood sugars and get annual eye exam and flu vaccine.    Hypothyroidism: Currently on levothyroxine supplementation, will follow labs including TSH and free T4    Diabetic neuropathy: On vitamin D and B12 supplementation.            Landon De Luna MD FACE.

## 2022-05-03 NOTE — PATIENT INSTRUCTIONS
Continue to use insulin pump and Dexcom monitoring system  Continue to watch for low blood sugars and always keep glucose source in case of low blood sugars  Get your labs done fasting in next few days  Annual eye exam and flu vaccine

## 2022-06-06 RX ORDER — INSULIN LISPRO 100 U/ML
INJECTION, SOLUTION INTRAVENOUS; SUBCUTANEOUS
Qty: 20 ML | Refills: 3 | Status: SHIPPED | OUTPATIENT
Start: 2022-06-06 | End: 2022-10-04 | Stop reason: SDUPTHER

## 2022-06-08 DIAGNOSIS — E10.65 TYPE 1 DIABETES MELLITUS WITH HYPERGLYCEMIA: ICD-10-CM

## 2022-06-08 RX ORDER — INSULIN PUMP CONTROLLER
EACH MISCELLANEOUS
Qty: 10 EACH | Refills: 3 | Status: SHIPPED | OUTPATIENT
Start: 2022-06-08 | End: 2022-10-04 | Stop reason: SDUPTHER

## 2022-08-08 ENCOUNTER — TELEPHONE (OUTPATIENT)
Dept: ENDOCRINOLOGY | Facility: CLINIC | Age: 27
End: 2022-08-08

## 2022-08-08 NOTE — TELEPHONE ENCOUNTER
Approved. Approved for OMNIPOD [place] PODS (GEN 4[place] [facility] [place] 10 per 30 days, under the pharmacy benefit. The drug has been approved from 08/08/2022 to 02/08/2023. Generic substitution required when available.    Patient informed.

## 2022-09-20 ENCOUNTER — TELEPHONE (OUTPATIENT)
Dept: ENDOCRINOLOGY | Facility: CLINIC | Age: 27
End: 2022-09-20

## 2022-09-20 NOTE — TELEPHONE ENCOUNTER
Patient called stating she gets her Dexcom Supplies through [facility]. She states she downloaded their kimberly and she usually just signs for it through the kimberly and they send it to her, but they are saying that they have no trace of her having one. She is not sure who she is supposed to call about that and her last sensor will end at 6:30pm tomorrow.

## 2022-09-20 NOTE — TELEPHONE ENCOUNTER
If she hasn't done so already, she should call [facility] because they likely have her order still.

## 2022-09-27 NOTE — TELEPHONE ENCOUNTER
Called [facility] and left a voicemail for them to call back either Madelyn or pt. Also sw pt and confirmed she has the correct phone # for [facility]. She will try to call them again if she doesn't hear back at some point today.

## 2022-09-27 NOTE — TELEPHONE ENCOUNTER
[facility] called back. Order was submitted to ins via [employer] on 9/22. Still awaiting approval from [facility]. [facility] will also reach out to pt to let them know.

## 2022-10-04 DIAGNOSIS — E10.65 TYPE 1 DIABETES MELLITUS WITH HYPERGLYCEMIA: ICD-10-CM

## 2022-10-04 RX ORDER — INSULIN PUMP CONTROLLER
1 EACH MISCELLANEOUS
Qty: 10 EACH | Refills: 3 | Status: SHIPPED | OUTPATIENT
Start: 2022-10-04 | End: 2022-12-15

## 2022-10-04 RX ORDER — INSULIN LISPRO 100 [IU]/ML
INJECTION, SOLUTION INTRAVENOUS; SUBCUTANEOUS
Qty: 20 ML | Refills: 3 | Status: SHIPPED | OUTPATIENT
Start: 2022-10-04 | End: 2022-12-15

## 2022-12-05 ENCOUNTER — TELEPHONE (OUTPATIENT)
Dept: ENDOCRINOLOGY | Facility: CLINIC | Age: 27
End: 2022-12-05

## 2022-12-05 RX ORDER — PROCHLORPERAZINE 25 MG/1
SUPPOSITORY RECTAL
Qty: 3 EACH | Refills: 6 | Status: SHIPPED | OUTPATIENT
Start: 2022-12-05

## 2022-12-05 RX ORDER — PROCHLORPERAZINE 25 MG/1
1 SUPPOSITORY RECTAL DAILY
Qty: 1 EACH | Refills: 2 | Status: SHIPPED | OUTPATIENT
Start: 2022-12-05

## 2022-12-09 ENCOUNTER — TELEPHONE (OUTPATIENT)
Dept: ENDOCRINOLOGY | Facility: CLINIC | Age: 27
End: 2022-12-09

## 2022-12-15 ENCOUNTER — OFFICE VISIT (OUTPATIENT)
Dept: ENDOCRINOLOGY | Facility: CLINIC | Age: 27
End: 2022-12-15

## 2022-12-15 ENCOUNTER — TELEPHONE (OUTPATIENT)
Dept: ENDOCRINOLOGY | Facility: CLINIC | Age: 27
End: 2022-12-15

## 2022-12-15 VITALS
OXYGEN SATURATION: 100 % | BODY MASS INDEX: 20.16 KG/M2 | HEIGHT: 59 IN | WEIGHT: 100 LBS | HEART RATE: 104 BPM | DIASTOLIC BLOOD PRESSURE: 70 MMHG | SYSTOLIC BLOOD PRESSURE: 110 MMHG

## 2022-12-15 DIAGNOSIS — E10.65 TYPE 1 DIABETES MELLITUS WITH HYPERGLYCEMIA: Primary | ICD-10-CM

## 2022-12-15 DIAGNOSIS — E03.9 ACQUIRED HYPOTHYROIDISM: ICD-10-CM

## 2022-12-15 DIAGNOSIS — E11.42 DIABETIC PERIPHERAL NEUROPATHY: ICD-10-CM

## 2022-12-15 PROCEDURE — 99214 OFFICE O/P EST MOD 30 MIN: CPT | Performed by: INTERNAL MEDICINE

## 2022-12-15 PROCEDURE — 95251 CONT GLUC MNTR ANALYSIS I&R: CPT | Performed by: INTERNAL MEDICINE

## 2022-12-15 RX ORDER — INSULIN LISPRO 100 U/ML
INJECTION, SOLUTION SUBCUTANEOUS
Qty: 10 ML | Refills: 6 | Status: SHIPPED | OUTPATIENT
Start: 2022-12-15

## 2022-12-15 RX ORDER — INSULIN GLARGINE 100 [IU]/ML
INJECTION, SOLUTION SUBCUTANEOUS
Qty: 10 ML | Refills: 6 | Status: SHIPPED | OUTPATIENT
Start: 2022-12-15

## 2022-12-15 NOTE — PROGRESS NOTES
Endocrine Progress Note Outpatient     Patient Care Team:  Antoinette Mcmahon MD as PCP - General    Chief Complaint: Follow-up type 1 diabetes    HPI: This is a 27-year-old female with history of type 1 diabetes, hypothyroidism and diabetic neuropathy is here for follow-up.    For type 1 diabetes: She is currently using Omni pod insulin pump.  She also have Dexcom continuous glucose monitoring system.  She was using OmniPod up until 2 days ago and then the pump went bad, since then she has been using insulin pens.       Her current insulin pump settings are as follows basal rate from midnight to midnight is 0.55 units/h.  Her insulin carb ratios are 1 unit for each 14 g of carbohydrate, insulin correction factor is 1 unit for each 35 mg blood sugar with a target blood sugar of 120-140 and active insulin is 4 hours.    Hypothyroidism: Currently on levothyroxine supplementation    Diabetic neuropathy: Currently on vitamin D and B12 supplementation.    Past Medical History:   Diagnosis Date   • Diabetes mellitus type I (HCC)        Social History     Socioeconomic History   • Marital status: Single   • Number of children: 0   • Years of education: 9   Tobacco Use   • Smoking status: Former   • Smokeless tobacco: Never   Vaping Use   • Vaping Use: Never used   Substance and Sexual Activity   • Alcohol use: Never   • Drug use: Defer   • Sexual activity: Defer       History reviewed. No pertinent family history.    No Known Allergies    ROS:   Constitutional:  Denies fatigue, tiredness.    Eyes:  Denies change in visual acuity   HENT:  Denies nasal congestion or sore throat   Respiratory: denies cough, shortness of breath.   Cardiovascular:  denies chest pain, edema   GI:  Denies abdominal pain, nausea, vomiting.   Musculoskeletal:  Denies back pain or joint pain   Integument:  Denies dry skin and rash   Neurologic:  Denies headache, focal weakness or sensory changes   Endocrine:  Denies polyuria or polydipsia    Psychiatric:  Denies depression or anxiety      Vitals:    12/15/22 1117   BP: 110/70   Pulse: 104   SpO2: 100%     Body mass index is 20.2 kg/m².     Physical Exam:  GEN: NAD, conversant  EYES: EOMI, PERRL, no conjunctival erythema  NECK: no thyromegaly, full ROM   CV: RRR, no murmurs/rubs/gallops, no peripheral edema  LUNG: CTAB, no wheezes/rales/ronchi  SKIN: no rashes, no acanthosis  MSK: no deformities, full ROM of all extremities  NEURO: no tremors, DTR normal  PSYCH: AOX3, appropriate mood, affect normal      Results Review:     I reviewed the patient's new clinical results.    Lab Results   Component Value Date    HGBA1C 11.3 (H) 10/29/2020      Lab Results   Component Value Date    GLUCOSE 739 (C) 10/29/2020    BUN 12 10/29/2020    CREATININE 0.72 10/29/2020    EGFRIFNONA 100 10/29/2020    BCR 16.7 10/29/2020    K 4.8 10/29/2020    CO2 23.3 10/29/2020    CALCIUM 9.2 10/29/2020    ALBUMIN 3.90 10/29/2020    LABIL2 1.1 07/25/2018    AST 13 10/29/2020    ALT 11 10/29/2020    CHOL 113 10/29/2020    TRIG 133 10/29/2020    LDL 48 10/29/2020    HDL 42 10/29/2020     Lab Results   Component Value Date    TSH 4.200 10/29/2020     A1c from December 2, 2022 was 9.8%    Dexcom download interpretation: Dates covered was between December 2, 2022 to December 15, 2022.  Average blood sugar was 356.  She is spending only 5% in the range and 95% above range and 0% below range.  Glucose are pretty much high all the time.    Medication Review: Reviewed.       Current Outpatient Medications:   •  Accu-Chek FastClix Lancets misc, Used to check blood sugars 4 times a day, Disp: 100 each, Rfl: 6  •  acetone, urine, test strip, Use to check for ketones PRN if BG > 250 or if ill, Disp: 100 each, Rfl: 1  •  B-12, Methylcobalamin, 1000 MCG sublingual tablet, PLACE 1 TABLET UNDER THE TONGUE DAILY., Disp: 60 tablet, Rfl: 5  •  Continuous Blood Gluc  (Dexcom G6 ) device, 1 Device Daily., Disp: 1 each, Rfl: 1  •   Continuous Blood Gluc Sensor (Dexcom G6 Sensor), Every 10 (Ten) Days. Change sensor every 10 days, Disp: 3 each, Rfl: 6  •  Continuous Blood Gluc Transmit (Dexcom G6 Transmitter) misc, 1 Device Daily. Change transmitter every 90 days, Disp: 1 each, Rfl: 2  •  Glucagon 3 MG/DOSE powder, 1 each into the nostril(s) as directed by provider As Needed (prn for severe hypoglycemia). Use prn for severe hypoglycemia., Disp: 1 each, Rfl: 1  •  glucose blood (Accu-Chek Guide) test strip, Check blood sugars 3 times per day, Disp: 100 each, Rfl: 12  •  Insulin Disposable Pump (Omnipod DASH Pods, Gen 4,) misc, 1 each by Other route Every 3 (Three) Days., Disp: 10 each, Rfl: 3  •  Insulin Lispro (Admelog) 100 UNIT/ML injection, MAX DOSE 61, Disp: 20 mL, Rfl: 3  •  Insulin Pen Needle 31G X 5 MM misc, BD PEN NEEDLE MINI U/F 31G X 5 MM, Disp: , Rfl:   •  levothyroxine (SYNTHROID, LEVOTHROID) 25 MCG tablet, TAKE 1 TABLET BY MOUTH EVERY DAY, Disp: 30 tablet, Rfl: 8      Assessment & Plan   Diabetes mellitus type 1 hyperglycemia: Uncontrolled with high blood sugars and A1c.  She would like to use insulin pens at this time.  Will add Basaglar 12 units subcu nightly and Admelog 4 units with each meal along with a sliding scale of Admelog 1 unit for each 50 mg sugar over 150 at meals.  She will stay off of insulin pump for now.  Recommend to continue to use Dexcom monitoring system.  Advised to always keep glucose source in case of low blood sugars.  Recommend annual eye exam and flu vaccine.  We will also refer her for diabetes educators to help troubleshoot her insulin pump.    Hypothyroidism: Currently on levothyroxine supplementation, will follow labs including TSH and free T4    Diabetic neuropathy: On vitamin D and B12 supplementation.    Assessment and plan from May 3, 2022 reviewed and updated.            Landon De Luna MD FACE.

## 2022-12-15 NOTE — PATIENT INSTRUCTIONS
Please hold insulin pump for now  Start Basaglar 12 units subcu nightly  Start Admelog 4 units with each meal along with a correction scale of 1 unit for each 50 mg blood sugar over 150 at meals only  Always keep glucose source in case of low blood sugar  Check labs today or next few days  Annual eye exam

## 2022-12-19 ENCOUNTER — TELEPHONE (OUTPATIENT)
Dept: ENDOCRINOLOGY | Facility: CLINIC | Age: 27
End: 2022-12-19

## 2022-12-19 NOTE — TELEPHONE ENCOUNTER
LVM for pt re referral from Dr. LOUIE to meet with educator troubleshoot insulin pump (omnipod). Will need to set up an appt.

## 2022-12-20 NOTE — TELEPHONE ENCOUNTER
Pharmacy sent over a script clarification form for Admelog Solostar injector pen. Pt was last prescribed the vials instead of the pen.  Needed clarification.

## 2023-02-08 RX ORDER — INSULIN LISPRO 100 [IU]/ML
INJECTION, SOLUTION INTRAVENOUS; SUBCUTANEOUS
Qty: 20 ML | Refills: 3 | Status: SHIPPED | OUTPATIENT
Start: 2023-02-08

## 2023-04-21 RX ORDER — INSULIN LISPRO 100 [IU]/ML
INJECTION, SOLUTION INTRAVENOUS; SUBCUTANEOUS
Qty: 20 ML | Refills: 3 | Status: SHIPPED | OUTPATIENT
Start: 2023-04-21

## 2023-06-14 DIAGNOSIS — E10.65 TYPE 1 DIABETES MELLITUS WITH HYPERGLYCEMIA: ICD-10-CM

## 2023-06-15 RX ORDER — INSULIN PUMP CONTROLLER
1 EACH MISCELLANEOUS
Qty: 10 EACH | Refills: 12 | Status: SHIPPED | OUTPATIENT
Start: 2023-06-15

## 2023-09-05 RX ORDER — PROCHLORPERAZINE 25 MG/1
SUPPOSITORY RECTAL
Qty: 3 EACH | Refills: 6 | Status: SHIPPED | OUTPATIENT
Start: 2023-09-05

## 2023-09-22 ENCOUNTER — TELEPHONE (OUTPATIENT)
Dept: ENDOCRINOLOGY | Facility: CLINIC | Age: 28
End: 2023-09-22
Payer: MEDICAID

## 2023-09-22 NOTE — TELEPHONE ENCOUNTER
Provider: NAYELY MEEK      Caller: GRUPO WALLACE     Relationship to Patient: SELF     Pharmacy: [facility]/pharmacy #[id number] - [place], [place] - [address] - [phone]  - [phone]  [phone]     Phone Number: [phone]    Reason for Call: PT IS CALLING BECAUSE SHE IS ON HER LAST OMNI POD SENSOR. SHE CHANGES OUT IN 2 DAYS AND SHE WILL HAVE NO MORE LEFT, HER PRESCRIPTION WON'T BE READY UNTIL OCT 3RD. SHE WOULD LIKE TO SPEAK WITH SOMEONE ABOUT WHAT SHE SHOULD DO.     When was the patient last seen: 07/13/23

## 2023-09-25 ENCOUNTER — TELEPHONE (OUTPATIENT)
Dept: ENDOCRINOLOGY | Facility: CLINIC | Age: 28
End: 2023-09-25

## 2023-09-25 NOTE — TELEPHONE ENCOUNTER
Pt left message stating that her omnipod is no longer covered by insurance and that she is wearing her last pod. Called pt's pharmacy--[facility]. Pharmacy tech re-sent PA. Completed and faxed back to pharmacy. Called pharmacy tech to f/u. Tech tells me pt's insurance is still not covering Omnipod 5, even after PA was submitted. Called pt back to inform her and advised her to call her insurance to figure out why no longer covering. Pt will need to change pod on Wednesday and leaves for [place] on Sat. If unable to get pods filled, pt will need to switch back to injections. Pt to call back once she has talked to insurance.

## 2023-09-25 NOTE — TELEPHONE ENCOUNTER
Name: GRUPO WALLACE         Relationship: SELF         Best Callback Number: [phone]         HUB PROVIDED THE RELAY MESSAGE FROM THE OFFICE    PATIENT HAS FURTHER QUESTIONS AND WOULD LIKE A CALL BACK AT THE FOLLOWING PHONE NUMBER [phone]    ADDITIONAL INFORMATION: WOULD LIKE TO SPEAK WITH RADHA AGAIN ON THIS MATTER. PLEASE GIVE PATIENT A  CALL BACK.

## 2023-09-26 ENCOUNTER — TELEPHONE (OUTPATIENT)
Dept: ENDOCRINOLOGY | Facility: CLINIC | Age: 28
End: 2023-09-26
Payer: MEDICAID

## 2023-09-26 NOTE — TELEPHONE ENCOUNTER
Pt called to f/u on Omnipod rx status. Per pt was told by pharmacy that Omnipod 5 needed to be put in as medical emergency for it to be covered by insurance. Discussed with our pharmacist, Evelia, who sent in order. Pt states that she has insulin pens for back-up. Will leave Dash pod samples for pt if unable to obtain Omnipod 5 pods tomorrow before she needs to change pod.

## 2023-09-27 NOTE — TELEPHONE ENCOUNTER
Pt came in to office to grab pods that educator had put away for her - pt states the pods were for the dash and she is now on the omnipod 5 so the pods are not good for that device - fidencio is working on getting pts stuff aprroved - pt stated she has everything she needs in case she goes without the omnipod 5 supplies

## 2023-09-28 ENCOUNTER — TELEPHONE (OUTPATIENT)
Dept: ENDOCRINOLOGY | Facility: CLINIC | Age: 28
End: 2023-09-28
Payer: MEDICAID

## 2023-09-28 NOTE — TELEPHONE ENCOUNTER
Specialty Pharmacy Patient Management Program  One-Time Clinical Outreach     Bruna Espinoza is a 27 y.o. female seen by an Endocrinology provider for Type 1 Diabetes.    Patient was started on omnipod started kit, but her insurance required a prior authorization for the omnipod refill. Our dietician reached out to pharmacy for assistance with processing the PA. Submitted in covermymeds (CMM) and initially received a denial back (CM Key: [id number]) because the prescription was written for patient to change pods every other day. Discussed with patient to clarify and she explained that she only changes the pod every 72 hours. Per the initial denial letter, the insurance will pay for 10 pod for 30 days. Attempted to re-submit the PA on CMM and was unable to as there was a previous denial on file. Called managed health services (insurance) to process a new PA over the phone and the representative said they would have to send me a paper form to complete and submit. Waiting to receive the paper PA form, then will complete and send back for review.    Evelia Alicea, PharmD  Clinical Specialty Pharmacist, Endocrinology  9/28/2023  11:32 EDT

## 2023-09-29 ENCOUNTER — TELEPHONE (OUTPATIENT)
Dept: ENDOCRINOLOGY | Facility: CLINIC | Age: 28
End: 2023-09-29
Payer: MEDICAID

## 2023-09-29 RX ORDER — INSULIN LISPRO 100 [IU]/ML
INJECTION, SOLUTION INTRAVENOUS; SUBCUTANEOUS
Qty: 20 ML | Refills: 3 | Status: SHIPPED | OUTPATIENT
Start: 2023-09-29

## 2023-09-29 RX ORDER — INSULIN GLARGINE 100 [IU]/ML
INJECTION, SOLUTION SUBCUTANEOUS
Qty: 10 ML | Refills: 6 | Status: SHIPPED | OUTPATIENT
Start: 2023-09-29

## 2023-09-29 NOTE — TELEPHONE ENCOUNTER
Pt unable to get Omnipod 5 covered and does not have all supplies for her DASH. Pt currently relying on injections 15 u Basaglar at night and 5 units Lispro with meals + 1 unit for every 50 mg >150. Pt reports that she has been running above 200 since taking off her last pod two days ago. Pt is also concerned that she may have strep throat. Advised pt to contact PCP or go to [facility] clinic because this may be contributing to her elevated BG. Pt to increase Basaglar from 15 to 18 units and increase Lispro from 5 to 6 units per meal per MD s/o. Pt is planning to leave for [place] this weekend. Gave pt on-call number and asked her to call if she continues >200 or starts to have lows. Reviewed treatment for hypoglycemia with pt.

## 2023-11-13 ENCOUNTER — TELEPHONE (OUTPATIENT)
Dept: ENDOCRINOLOGY | Facility: CLINIC | Age: 28
End: 2023-11-13

## 2023-11-13 NOTE — TELEPHONE ENCOUNTER
Caller: Bruna Espinoza    Relationship: Self    Best call back number: [phone]    What is the best time to reach you: ANYTIME    Who are you requesting to speak with (clinical staff, provider,  specific staff member): CLINICAL        What was the call regarding: HER DEXCOM ISN'T WORKING CORRECTLY. SHE SAID IT WAS LOW, BUT SHE CHECKED ON HER METER THAT IT . UNABLE TO WT, PLEASE CALL BACK

## 2023-11-14 NOTE — TELEPHONE ENCOUNTER
Hub staff attempted to follow warm transfer process and was unsuccessful     Caller: Bruna Espinoza    Relationship to patient: Self    Best call back number: [phone]    Patient is needing: PATIENT HASN'T HEARD ANYTHING BACK AND SHE IS HAVING ISSUES WITH HER DEXCOM

## 2023-11-17 NOTE — TELEPHONE ENCOUNTER
Hub staff attempted to follow warm transfer process and was unsuccessful     Caller: Bruna Espinoza    Relationship to patient: Self    Best call back number: [phone]    Patient is needing: PT IS STILL HAVING ISSUES WITH DEXCOM AND NEEDS A CALL BACK

## 2023-11-29 ENCOUNTER — TELEPHONE (OUTPATIENT)
Dept: ENDOCRINOLOGY | Facility: CLINIC | Age: 28
End: 2023-11-29

## 2023-11-29 NOTE — TELEPHONE ENCOUNTER
[facility] oral surgery Dr Fab Cole is asking for surgery clearance, all teeth will be removed, and needs IV sedation in office. Please Advise.     Needs Most recent A1C sent and clearance for surgery,     Fax [phone]

## 2023-12-13 NOTE — TELEPHONE ENCOUNTER
Caller: Bruna Espinoza    Relationship to patient: Self    Best call back number: [phone]     Patient is needing: PT NEEDS TO GET A1C AGAIN FOR DENTIST APT SO IT MUST GO DOWN BEFORE PT CAN BE SEEN. PT WANTS TO GET DONE IN [place] AT [facility] OR AT THE HOSPITAL. PLEASE CHECK AND ADVISE PLEASE CALL PT TO LET THEM KNOW WE HAVE SENT ORDERS.

## 2024-03-05 ENCOUNTER — TELEPHONE (OUTPATIENT)
Dept: ENDOCRINOLOGY | Facility: CLINIC | Age: 29
End: 2024-03-05

## 2024-03-05 ENCOUNTER — OFFICE VISIT (OUTPATIENT)
Dept: ENDOCRINOLOGY | Facility: CLINIC | Age: 29
End: 2024-03-05
Payer: MEDICAID

## 2024-03-05 VITALS
WEIGHT: 103 LBS | OXYGEN SATURATION: 98 % | HEIGHT: 59 IN | BODY MASS INDEX: 20.76 KG/M2 | DIASTOLIC BLOOD PRESSURE: 82 MMHG | SYSTOLIC BLOOD PRESSURE: 118 MMHG | HEART RATE: 96 BPM

## 2024-03-05 DIAGNOSIS — E10.65 UNCONTROLLED TYPE 1 DIABETES MELLITUS WITH HYPERGLYCEMIA: ICD-10-CM

## 2024-03-05 DIAGNOSIS — E03.9 ACQUIRED HYPOTHYROIDISM: ICD-10-CM

## 2024-03-05 DIAGNOSIS — E10.65 TYPE 1 DIABETES MELLITUS WITH HYPERGLYCEMIA: Primary | ICD-10-CM

## 2024-03-05 DIAGNOSIS — E11.42 DIABETIC PERIPHERAL NEUROPATHY: ICD-10-CM

## 2024-03-05 PROCEDURE — 1160F RVW MEDS BY RX/DR IN RCRD: CPT | Performed by: INTERNAL MEDICINE

## 2024-03-05 PROCEDURE — 1159F MED LIST DOCD IN RCRD: CPT | Performed by: INTERNAL MEDICINE

## 2024-03-05 PROCEDURE — 99214 OFFICE O/P EST MOD 30 MIN: CPT | Performed by: INTERNAL MEDICINE

## 2024-03-05 RX ORDER — BLOOD SUGAR DIAGNOSTIC
STRIP MISCELLANEOUS
Qty: 100 EACH | Refills: 12 | Status: SHIPPED | OUTPATIENT
Start: 2024-03-05

## 2024-03-05 RX ORDER — BLOOD-GLUCOSE METER
1 EACH MISCELLANEOUS DAILY
Qty: 1 KIT | Refills: 0 | Status: SHIPPED | OUTPATIENT
Start: 2024-03-05

## 2024-03-05 RX ORDER — PROCHLORPERAZINE 25 MG/1
1 SUPPOSITORY RECTAL DAILY
Qty: 1 EACH | Refills: 2 | Status: SHIPPED | OUTPATIENT
Start: 2024-03-05

## 2024-03-05 RX ORDER — INSULIN LISPRO 100 [IU]/ML
INJECTION, SOLUTION INTRAVENOUS; SUBCUTANEOUS
Qty: 15 ML | Refills: 3 | Status: SHIPPED | OUTPATIENT
Start: 2024-03-05

## 2024-03-05 NOTE — TELEPHONE ENCOUNTER
Patient LVM in regards to having questions about her Omnipod. This MA returned her vm and she said that she called Omnipod they did a factory reset. When doing the factory reset, then it came back to a software issue. This MA advised the patient to call Feroz ([phone]) in regards to this matter. [facility] also [facility] in regards to Dexcom transmitter being . This MA advised the patient to let them know that during her appt today @ 3:00 pm. Patient expressed understanding.

## 2024-03-05 NOTE — PROGRESS NOTES
Endocrine Progress Note Outpatient     Patient Care Team:  Antoinette Mcmahon MD as PCP - General    Chief Complaint: Follow-up type 1 diabetes    HPI: This is a 28-year-old female with history of type 1 diabetes, hypothyroidism and diabetic neuropathy is here for follow-up.    For type 1 diabetes: She is currently using Basaglar about 12 units nightly and Humalog/Admelog with each meal  1 unit for each 50 mg blood sugar over 150 at meals.  She unfortunately is not doing any base dose for the Admelog.  She did bring in blood sugar records for review and they are running anywhere between +.  She has not been hospitalized with low or high blood sugar since last seen.    She does have a OmniPod system and if she is not using it,  as she needs to set up the training.    Hypothyroidism: Currently on levothyroxine supplementation    Diabetic neuropathy: Currently on vitamin D and B12 supplementation.    Past Medical History:   Diagnosis Date    Diabetes mellitus type I        Social History     Socioeconomic History    Marital status: Single    Number of children: 0    Years of education: 9   Tobacco Use    Smoking status: Former    Smokeless tobacco: Never   Vaping Use    Vaping status: Never Used   Substance and Sexual Activity    Alcohol use: Never    Drug use: Defer    Sexual activity: Defer       History reviewed. No pertinent family history.    No Known Allergies    ROS:   Constitutional:  Denies fatigue, tiredness.    Eyes:  Denies change in visual acuity   HENT:  Denies nasal congestion or sore throat   Respiratory: denies cough, shortness of breath.   Cardiovascular:  denies chest pain, edema   GI:  Denies abdominal pain, nausea, vomiting.   Musculoskeletal:  Denies back pain or joint pain   Integument:  Denies dry skin and rash   Neurologic:  Denies headache, focal weakness or sensory changes   Endocrine:  Denies polyuria or polydipsia   Psychiatric:  Denies depression or anxiety      Vitals:    03/05/24  1454   BP: 118/82   Pulse: 96   SpO2: 98%     Body mass index is 20.8 kg/m².     Physical Exam:  GEN: NAD, conversant  EYES: EOMI,   NECK: no thyromegaly,  CV: RRR,   LUNG: CTA  PSYCH: AOX3, appropriate mood, affect normal      Results Review:     I reviewed the patient's new clinical results.    Lab Results   Component Value Date    HGBA1C 10.20 (H) 07/13/2023    HGBA1C 11.3 (H) 10/29/2020      Lab Results   Component Value Date    GLUCOSE 367 (H) 07/13/2023    BUN 18 07/13/2023    CREATININE 0.89 07/13/2023    EGFRIFNONA 100 10/29/2020    BCR 20.2 07/13/2023    K 4.8 07/13/2023    CO2 25.7 07/13/2023    CALCIUM 9.5 07/13/2023    ALBUMIN 3.9 07/13/2023    LABIL2 1.1 07/25/2018    AST 13 07/13/2023    ALT 13 07/13/2023    CHOL 185 07/13/2023    TRIG 96 07/13/2023     (H) 07/13/2023    HDL 66 (H) 07/13/2023     Lab Results   Component Value Date    TSH 2.100 07/13/2023    FREET4 0.96 07/13/2023       Medication Review: Reviewed.       Current Outpatient Medications:     Accu-Chek FastClix Lancets misc, Used to check blood sugars 4 times a day, Disp: 100 each, Rfl: 6    acetone, urine, test strip, Use to check for ketones PRN if BG > 250 or if ill, Disp: 100 each, Rfl: 1    B-12, Methylcobalamin, 1000 MCG sublingual tablet, PLACE 1 TABLET UNDER THE TONGUE DAILY., Disp: 60 tablet, Rfl: 5    Banophen 25 MG capsule, TAKE 1 CAPSULE BY MOUTH THREE TIMES A DAY FOR 5 DAYS AS NEEDED FOR ALLERGY SYMPTOMS, Disp: , Rfl:     Continuous Blood Gluc  (Dexcom G6 ) device, 1 Device Daily., Disp: 1 each, Rfl: 1    Continuous Blood Gluc Sensor (Dexcom G6 Sensor), CHANGE SENSOR EVERY 10 DAYS, Disp: 3 each, Rfl: 6    Continuous Blood Gluc Transmit (Dexcom G6 Transmitter) misc, 1 DEVICE DAILY. CHANGE TRANSMITTER EVERY 90 DAYS, Disp: 1 each, Rfl: 2    Glucagon 3 MG/DOSE powder, 1 each into the nostril(s) as directed by provider As Needed (prn for severe hypoglycemia). Use prn for severe hypoglycemia., Disp: 1 each,  Rfl: 1    glucose blood (Accu-Chek Guide) test strip, Check blood sugars 3 times per day, Disp: 100 each, Rfl: 12    hydrocortisone 0.5 % cream, apply to affected area twice a day for 7 days, Disp: , Rfl:     Insulin Disposable Pump (Omnipod 5 G6 Intro, Gen 5,) kit, 1 each Continuous., Disp: 1 kit, Rfl: 0    Insulin Disposable Pump (Omnipod 5 G6 Pod, Gen 5,) misc, 1 each Every Other Day., Disp: 15 each, Rfl: 11    Insulin Glargine (BASAGLAR KWIKPEN) 100 UNIT/ML injection pen, Inject 18 units subcu nightly., Disp: 10 mL, Rfl: 6    Insulin Lispro (Admelog) 100 UNIT/ML injection, Inject 6 units subcu before each meal along with a correction scale 1 unit for each 50 mg blood sugar over 150 at meals. MDD 30 units, Disp: 20 mL, Rfl: 3    Insulin Pen Needle 31G X 5 MM misc, For use for the insulin injection by the insulin pens, Disp: 100 each, Rfl: 6      Assessment & Plan   Diabetes mellitus type 1 hyperglycemia: Uncontrolled, she does have OmniPod with Dexcom G6 monitoring system.  She is advised to set up the training and start using OmniPod with a Dexcom as soon as possible.  In the meantime I will ask her to take Admelog 4 units as a base dose plus the sliding scale 1 unit for each 50 mg blood sugar over 150 at meals along with continuing Basaglar 12 units subcu daily.  Will follow A1c.    Hypothyroidism: She is not on thyroid medications at this time.  Will follow labs.    Diabetic neuropathy: On vitamin D and B12 supplementation.    Assessment and plan from July 13, 2023 reviewed and updated.            Landon De Luna MD FACE.

## 2024-03-05 NOTE — PATIENT INSTRUCTIONS
Please take Admelog 4 units as a base dose plus sliding scale 1 unit for each 50 mg blood sugar over 150 at meals  Please call ins[employer] and set up the training for your OmniPod and Dexcom monitoring system  Continue Basaglar 12 units subcu daily  Always keep glucose source in case of low blood sugar  Labs fasting in the next few days.

## 2024-03-11 ENCOUNTER — TELEPHONE (OUTPATIENT)
Dept: ENDOCRINOLOGY | Facility: CLINIC | Age: 29
End: 2024-03-11

## 2024-03-11 NOTE — TELEPHONE ENCOUNTER
"This MA spoke with the patient in regards to if she needed a Omnipod 5 G6 pods (Gen 5). Patient said that she had plenty of the pods. This MA asked her if she has called our office in regards to needing to get scheduled for classes. Patient said that she has called, got an email, and got the setting set up. Patient was wanting a PA for the medication Dexcom G6 Transmitter. This MA proceeded with the PA, and it came back stating \"Prior Authorization Not Required\". This MA called back patient and informed her as to what's going on, and she would need to call her insurance due to not getting the medication at the pharmacy.  "

## 2024-03-11 NOTE — TELEPHONE ENCOUNTER
Hub staff attempted to follow warm transfer process and was unsuccessful     Caller: Bruna Espinoza    Relationship to patient: Self    Best call back number: [phone]    Patient is needing: RETURNING PHONE CALL

## 2024-03-13 ENCOUNTER — TELEPHONE (OUTPATIENT)
Dept: ENDOCRINOLOGY | Facility: CLINIC | Age: 29
End: 2024-03-13
Payer: MEDICAID

## 2024-03-13 NOTE — TELEPHONE ENCOUNTER
This MA did a PA for the medication Dexcom G6 Sensor. At this time, this MA is approved:    (Key: [id number])  PA Case ID #: [id number]  Rx #: [id number]  Need Help? Call us at [phone]  Outcome  Approved today  PA Case: [id number], Status: Approved, Coverage Starts on: 3/13/2024 12:00:00 AM, Coverage Ends on: 3/13/2025 12:00:00 AM.  Authorization Expiration Date: 3/12/2025  Drug  Dexcom G6 Sensor  ePA cloud logo  Form  [place] [employer] Electronic PA Form (2017 [employer])  Original Claim Info  75,70,MR SUBMIT PA REQUEST TO: [email or web address], CONTACT [employer] REQUIRES PRIOR AUTHORIZATION

## 2024-04-02 RX ORDER — BLOOD-GLUCOSE METER
EACH MISCELLANEOUS
Qty: 1 KIT | Refills: 0 | Status: SHIPPED | OUTPATIENT
Start: 2024-04-02

## 2024-04-29 RX ORDER — PROCHLORPERAZINE 25 MG/1
SUPPOSITORY RECTAL
Qty: 3 EACH | Refills: 6 | Status: SHIPPED | OUTPATIENT
Start: 2024-04-29

## 2024-06-27 DIAGNOSIS — E10.65 TYPE 1 DIABETES MELLITUS WITH HYPERGLYCEMIA: ICD-10-CM

## 2024-06-27 RX ORDER — INSULIN PMP CART,AUT,G6/7,CNTR
EACH SUBCUTANEOUS
Qty: 15 EACH | Refills: 6 | Status: SHIPPED | OUTPATIENT
Start: 2024-06-27

## 2024-08-06 DIAGNOSIS — E10.65 TYPE 1 DIABETES MELLITUS WITH HYPERGLYCEMIA: Primary | ICD-10-CM

## 2024-08-20 DIAGNOSIS — E10.65 TYPE 1 DIABETES MELLITUS WITH HYPERGLYCEMIA: Primary | ICD-10-CM

## 2024-08-22 RX ORDER — INSULIN LISPRO 100 [IU]/ML
INJECTION, SOLUTION INTRAVENOUS; SUBCUTANEOUS
Qty: 30 ML | Refills: 3 | Status: SHIPPED | OUTPATIENT
Start: 2024-08-22

## 2024-09-17 ENCOUNTER — OFFICE VISIT (OUTPATIENT)
Dept: ENDOCRINOLOGY | Facility: CLINIC | Age: 29
End: 2024-09-17
Payer: MEDICAID

## 2024-09-17 ENCOUNTER — LAB (OUTPATIENT)
Dept: LAB | Facility: HOSPITAL | Age: 29
End: 2024-09-17
Payer: MEDICAID

## 2024-09-17 VITALS
DIASTOLIC BLOOD PRESSURE: 78 MMHG | SYSTOLIC BLOOD PRESSURE: 120 MMHG | HEIGHT: 59 IN | HEART RATE: 83 BPM | BODY MASS INDEX: 22.38 KG/M2 | OXYGEN SATURATION: 97 % | WEIGHT: 111 LBS

## 2024-09-17 DIAGNOSIS — E03.9 ACQUIRED HYPOTHYROIDISM: ICD-10-CM

## 2024-09-17 DIAGNOSIS — E10.65 TYPE 1 DIABETES MELLITUS WITH HYPERGLYCEMIA: Primary | ICD-10-CM

## 2024-09-17 DIAGNOSIS — E11.42 DIABETIC PERIPHERAL NEUROPATHY: ICD-10-CM

## 2024-09-17 DIAGNOSIS — E10.65 TYPE 1 DIABETES MELLITUS WITH HYPERGLYCEMIA: ICD-10-CM

## 2024-09-17 LAB
HBA1C MFR BLD: 8.59 % (ref 4.8–5.6)
T4 FREE SERPL-MCNC: 0.91 NG/DL (ref 0.93–1.7)
TSH SERPL DL<=0.05 MIU/L-ACNC: 2.7 UIU/ML (ref 0.27–4.2)

## 2024-09-17 PROCEDURE — 84443 ASSAY THYROID STIM HORMONE: CPT

## 2024-09-17 PROCEDURE — 82043 UR ALBUMIN QUANTITATIVE: CPT

## 2024-09-17 PROCEDURE — 36415 COLL VENOUS BLD VENIPUNCTURE: CPT

## 2024-09-17 PROCEDURE — 1159F MED LIST DOCD IN RCRD: CPT | Performed by: INTERNAL MEDICINE

## 2024-09-17 PROCEDURE — 95251 CONT GLUC MNTR ANALYSIS I&R: CPT | Performed by: INTERNAL MEDICINE

## 2024-09-17 PROCEDURE — 80053 COMPREHEN METABOLIC PANEL: CPT

## 2024-09-17 PROCEDURE — 84439 ASSAY OF FREE THYROXINE: CPT

## 2024-09-17 PROCEDURE — 80061 LIPID PANEL: CPT

## 2024-09-17 PROCEDURE — 1160F RVW MEDS BY RX/DR IN RCRD: CPT | Performed by: INTERNAL MEDICINE

## 2024-09-17 PROCEDURE — 99214 OFFICE O/P EST MOD 30 MIN: CPT | Performed by: INTERNAL MEDICINE

## 2024-09-17 PROCEDURE — 83036 HEMOGLOBIN GLYCOSYLATED A1C: CPT

## 2024-09-17 PROCEDURE — 82570 ASSAY OF URINE CREATININE: CPT

## 2024-09-18 LAB
ALBUMIN SERPL-MCNC: 3.6 G/DL (ref 3.5–5.2)
ALBUMIN UR-MCNC: 123.5 MG/DL
ALBUMIN/GLOB SERPL: 1.3 G/DL
ALP SERPL-CCNC: 58 U/L (ref 39–117)
ALT SERPL W P-5'-P-CCNC: 8 U/L (ref 1–33)
ANION GAP SERPL CALCULATED.3IONS-SCNC: 8.2 MMOL/L (ref 5–15)
AST SERPL-CCNC: 14 U/L (ref 1–32)
BILIRUB SERPL-MCNC: 0.2 MG/DL (ref 0–1.2)
BUN SERPL-MCNC: 13 MG/DL (ref 6–20)
BUN/CREAT SERPL: 14.3 (ref 7–25)
CALCIUM SPEC-SCNC: 9 MG/DL (ref 8.6–10.5)
CHLORIDE SERPL-SCNC: 105 MMOL/L (ref 98–107)
CHOLEST SERPL-MCNC: 148 MG/DL (ref 0–200)
CO2 SERPL-SCNC: 26.8 MMOL/L (ref 22–29)
CREAT SERPL-MCNC: 0.91 MG/DL (ref 0.57–1)
CREAT UR-MCNC: 96.1 MG/DL
EGFRCR SERPLBLD CKD-EPI 2021: 89.4 ML/MIN/1.73
GLOBULIN UR ELPH-MCNC: 2.7 GM/DL
GLUCOSE SERPL-MCNC: 208 MG/DL (ref 65–99)
HDLC SERPL-MCNC: 49 MG/DL (ref 40–60)
LDLC SERPL CALC-MCNC: 80 MG/DL (ref 0–100)
LDLC/HDLC SERPL: 1.6 {RATIO}
MICROALBUMIN/CREAT UR: 1285.1 MG/G (ref 0–29)
POTASSIUM SERPL-SCNC: 4.4 MMOL/L (ref 3.5–5.2)
PROT SERPL-MCNC: 6.3 G/DL (ref 6–8.5)
SODIUM SERPL-SCNC: 140 MMOL/L (ref 136–145)
TRIGL SERPL-MCNC: 104 MG/DL (ref 0–150)
VLDLC SERPL-MCNC: 19 MG/DL (ref 5–40)

## 2024-09-20 DIAGNOSIS — E10.65 TYPE 1 DIABETES MELLITUS WITH HYPERGLYCEMIA: Primary | ICD-10-CM

## 2024-09-20 RX ORDER — LISINOPRIL 2.5 MG/1
2.5 TABLET ORAL DAILY
Qty: 30 TABLET | Refills: 11 | Status: SHIPPED | OUTPATIENT
Start: 2024-09-20 | End: 2025-09-20

## 2024-11-19 ENCOUNTER — TELEPHONE (OUTPATIENT)
Dept: ENDOCRINOLOGY | Facility: CLINIC | Age: 29
End: 2024-11-19
Payer: COMMERCIAL

## 2024-11-19 NOTE — TELEPHONE ENCOUNTER
Caller: Bruna Espinoza    Relationship: Self    Best call back number: [phone]     Requested Prescriptions:   Requested Prescriptions      No prescriptions requested or ordered in this encounter   DEXCOM G7    Pharmacy where request should be sent:    [facility] [place]    Last office visit with prescribing clinician: 9/17/2024     Next office visit with prescribing clinician: 4/28/2025     Additional details provided by patient: PT WOULD LIKE TO SWITCH FROM G6 TO G7. PLEASE REVIEW AND ADVISE.     Does the patient have less than a 3 day supply:  [] Yes  [x] No    Would you like a call back once the refill request has been completed: [x] Yes [] No    If the office needs to give you a call back, can they leave a voicemail: [x] Yes [] No    Venkat Falk Rep   11/19/24 11:49 EST

## 2024-11-20 NOTE — TELEPHONE ENCOUNTER
She called them and they told her that it would  work with her Omnipod but she would have to find different pods that says G7 on package. Please advise,

## 2024-11-21 RX ORDER — ACYCLOVIR 400 MG/1
1 TABLET ORAL DAILY
Qty: 9 EACH | Refills: 3 | Status: SHIPPED | OUTPATIENT
Start: 2024-11-21

## 2024-11-21 RX ORDER — INSULIN PMP CART,AUT,G6/7,CNTR
1 EACH SUBCUTANEOUS DAILY
Qty: 10 EACH | Refills: 6 | Status: SHIPPED | OUTPATIENT
Start: 2024-11-21

## 2024-11-26 ENCOUNTER — TELEPHONE (OUTPATIENT)
Dept: ENDOCRINOLOGY | Facility: CLINIC | Age: 29
End: 2024-11-26
Payer: COMMERCIAL

## 2024-12-01 RX ORDER — ACYCLOVIR 400 MG/1
1 TABLET ORAL DAILY
Qty: 1 EACH | Refills: 0 | Status: SHIPPED | OUTPATIENT
Start: 2024-12-01

## 2024-12-02 RX ORDER — PROCHLORPERAZINE 25 MG/1
SUPPOSITORY RECTAL
Qty: 3 EACH | Refills: 6 | Status: SHIPPED | OUTPATIENT
Start: 2024-12-02

## 2024-12-07 DIAGNOSIS — E10.65 TYPE 1 DIABETES MELLITUS WITH HYPERGLYCEMIA: Primary | ICD-10-CM

## 2024-12-09 RX ORDER — PROCHLORPERAZINE 25 MG/1
1 SUPPOSITORY RECTAL DAILY
Qty: 1 EACH | Refills: 2 | Status: SHIPPED | OUTPATIENT
Start: 2024-12-09

## 2025-03-10 ENCOUNTER — TELEPHONE (OUTPATIENT)
Dept: ENDOCRINOLOGY | Facility: CLINIC | Age: 30
End: 2025-03-10
Payer: COMMERCIAL

## 2025-03-10 NOTE — TELEPHONE ENCOUNTER
Pt stated she went to hospital on Saturday with bad diarrhea and they tested her stool and stated pt had KG and wanted pt to stop taking lisinopril and to notify provider.Please advise.

## 2025-03-24 DIAGNOSIS — E10.65 TYPE 1 DIABETES MELLITUS WITH HYPERGLYCEMIA: ICD-10-CM

## 2025-03-24 RX ORDER — PROCHLORPERAZINE 25 MG/1
1 SUPPOSITORY RECTAL DAILY
Qty: 1 EACH | Refills: 2 | Status: SHIPPED | OUTPATIENT
Start: 2025-03-24

## 2025-03-24 RX ORDER — PROCHLORPERAZINE 25 MG/1
SUPPOSITORY RECTAL
Qty: 3 EACH | Refills: 6 | Status: SHIPPED | OUTPATIENT
Start: 2025-03-24

## 2025-04-02 DIAGNOSIS — E10.65 TYPE 1 DIABETES MELLITUS WITH HYPERGLYCEMIA: ICD-10-CM

## 2025-04-02 RX ORDER — INSULIN LISPRO 100 [IU]/ML
INJECTION, SOLUTION INTRAVENOUS; SUBCUTANEOUS
Qty: 20 ML | Refills: 1 | Status: SHIPPED | OUTPATIENT
Start: 2025-04-02

## 2025-04-28 ENCOUNTER — OFFICE VISIT (OUTPATIENT)
Dept: ENDOCRINOLOGY | Facility: CLINIC | Age: 30
End: 2025-04-28
Payer: MEDICAID

## 2025-04-28 VITALS
OXYGEN SATURATION: 98 % | SYSTOLIC BLOOD PRESSURE: 115 MMHG | BODY MASS INDEX: 22.38 KG/M2 | HEIGHT: 59 IN | DIASTOLIC BLOOD PRESSURE: 72 MMHG | WEIGHT: 111 LBS | HEART RATE: 95 BPM

## 2025-04-28 DIAGNOSIS — E10.65 TYPE 1 DIABETES MELLITUS WITH HYPERGLYCEMIA: Primary | ICD-10-CM

## 2025-04-28 DIAGNOSIS — E03.9 ACQUIRED HYPOTHYROIDISM: ICD-10-CM

## 2025-04-28 DIAGNOSIS — E11.42 DIABETIC PERIPHERAL NEUROPATHY: ICD-10-CM

## 2025-04-28 PROCEDURE — 95251 CONT GLUC MNTR ANALYSIS I&R: CPT | Performed by: INTERNAL MEDICINE

## 2025-04-28 PROCEDURE — 1159F MED LIST DOCD IN RCRD: CPT | Performed by: INTERNAL MEDICINE

## 2025-04-28 PROCEDURE — 99214 OFFICE O/P EST MOD 30 MIN: CPT | Performed by: INTERNAL MEDICINE

## 2025-04-28 PROCEDURE — 1160F RVW MEDS BY RX/DR IN RCRD: CPT | Performed by: INTERNAL MEDICINE

## 2025-04-28 RX ORDER — RIFAXIMIN 550 MG/1
1 TABLET ORAL 3 TIMES DAILY
COMMUNITY
Start: 2025-04-23

## 2025-04-28 NOTE — PROGRESS NOTES
Endocrine Progress Note Outpatient     Patient Care Team:  Antoinette Mcmahon MD as PCP - General    Chief Complaint: Follow-up type 1 diabetes    HPI: This is a 29-year-old female with history of type 1 diabetes, hypothyroidism and diabetic neuropathy is here for follow-up.    For type 1 diabetes: She is now using OmniPod 5 system with Dexcom monitoring system.  She is using lispro insulin in the pump.  Her current pump settings are as follows basal rate from midnight to midnight is 0.65 units/h.  Her insulin carb ratio is at 1 unit for each 5 g of carbohydrate with a correction scale of 1 unit for each 35 mg blood sugar with a target blood sugar of 130 and active insulin is 4 hours.  She is currently using auto mode.    Hypothyroidism: She said she has not been able to receive her thyroid prescriptions and has not been taking it.    Diabetic neuropathy: Currently on vitamin D and B12 supplementation.    Past Medical History:   Diagnosis Date    Diabetes mellitus type I        Social History     Socioeconomic History    Marital status: Single    Number of children: 0    Years of education: 9   Tobacco Use    Smoking status: Former    Smokeless tobacco: Never   Vaping Use    Vaping status: Every Day    Substances: Nicotine    Devices: Disposable   Substance and Sexual Activity    Alcohol use: Never    Drug use: Defer    Sexual activity: Defer       History reviewed. No pertinent family history.    No Known Allergies    ROS:   Constitutional:  Denies fatigue, tiredness.    Eyes:  Denies change in visual acuity   HENT:  Denies nasal congestion or sore throat   Respiratory: denies cough, shortness of breath.   Cardiovascular:  denies chest pain, edema   GI:  Denies abdominal pain, nausea, vomiting.   Musculoskeletal:  Denies back pain or joint pain   Integument:  Denies dry skin and rash   Neurologic:  Denies headache, focal weakness or sensory changes   Endocrine:  Denies polyuria or polydipsia   Psychiatric:  Denies  depression or anxiety      Vitals:    04/28/25 1449   BP: 115/72   Pulse: 95   SpO2: 98%       Body mass index is 22.42 kg/m².     Physical Exam:  GEN: NAD, conversant  EYES: EOMI,   NECK: no thyromegaly,  CV: RRR,   LUNG: CTA  PSYCH: AOX3, appropriate mood, affect normal      Results Review:     I reviewed the patient's new clinical results.    Lab Results   Component Value Date    HGBA1C 8.59 (H) 09/17/2024    HGBA1C 10.20 (H) 07/13/2023    HGBA1C 11.3 (H) 10/29/2020      Lab Results   Component Value Date    GLUCOSE 208 (H) 09/17/2024    BUN 13 09/17/2024    CREATININE 0.91 09/17/2024    EGFRIFNONA 100 10/29/2020    BCR 14.3 09/17/2024    K 4.4 09/17/2024    CO2 26.8 09/17/2024    CALCIUM 9.0 09/17/2024    ALBUMIN 3.6 09/17/2024    AST 14 09/17/2024    ALT 8 09/17/2024    CHOL 148 09/17/2024    TRIG 104 09/17/2024    LDL 80 09/17/2024    HDL 49 09/17/2024     Lab Results   Component Value Date    TSH 2.700 09/17/2024    FREET4 0.91 (L) 09/17/2024     Dexcom download interpretation: Dates covered was between April 15, 2025 to April 28, 2025.  Average blood sugar was 232.  Spending about 28% in the range and 72% above range and 0% below range blood glucose graph does show significant postprandial hyperglycemia.      Medication Review: Reviewed.       Current Outpatient Medications:     Accu-Chek FastClix Lancets misc, Used to check blood sugars 4 times a day, Disp: 100 each, Rfl: 6    acetone, urine, test strip, Use to check for ketones PRN if BG > 250 or if ill, Disp: 100 each, Rfl: 1    B-12, Methylcobalamin, 1000 MCG sublingual tablet, PLACE 1 TABLET UNDER THE TONGUE DAILY., Disp: 60 tablet, Rfl: 5    Banophen 25 MG capsule, TAKE 1 CAPSULE BY MOUTH THREE TIMES A DAY FOR 5 DAYS AS NEEDED FOR ALLERGY SYMPTOMS, Disp: , Rfl:     Blood Glucose Monitoring Suppl (Accu-Chek Guide Me) w/Device kit, USE 1 DEVICE DAILY., Disp: 1 kit, Rfl: 0    Continuous Glucose  (Dexcom G7 ) device, Use 1 Device Daily.  Used to check bs 365 days.Dx:E10.65, Disp: 1 each, Rfl: 0    glucose blood (Accu-Chek Guide) test strip, Check blood sugars 3 times per day, Disp: 100 each, Rfl: 12    Insulin Disposable Pump (Omnipod 5 [id number] Pods Gen 5) misc, Use 1 package Daily., Disp: 10 each, Rfl: 6    Insulin Disposable Pump (Omnipod 5 G6 Intro, Gen 5,) kit, 1 each Continuous., Disp: 1 kit, Rfl: 0    Insulin Disposable Pump (Omnipod 5 G6 Pods, Gen 5,) misc, USE AS DIRECTED EVERY OTHER DAY, Disp: 15 each, Rfl: 6    Insulin Glargine (BASAGLAR KWIKPEN) 100 UNIT/ML injection pen, Inject 18 units subcu nightly., Disp: 10 mL, Rfl: 6    Insulin Lispro (HumaLOG) 100 UNIT/ML injection, USE UP TO 60 UNITS A DAY PER PUMP, Disp: 20 mL, Rfl: 1    Insulin Pen Needle 31G X 5 MM misc, For use for the insulin injection by the insulin pens, Disp: 100 each, Rfl: 6    lisinopril (Zestril) 2.5 MG tablet, Take 1 tablet by mouth Daily., Disp: 30 tablet, Rfl: 11    Nutritional Supplements (Glucose Management) tablet, Take 3 tablets for blood sugar less than 70., Disp: 100 tablet, Rfl: 6    Xifaxan 550 MG tablet, Take 1 tablet by mouth 3 times a day., Disp: , Rfl:     Continuous Glucose Sensor (Dexcom G6 Sensor), Use Every 10 (Ten) Days. Dx:E10.65 (Patient not taking: Reported on 4/28/2025), Disp: 3 each, Rfl: 6    Continuous Glucose Transmitter (Dexcom G6 Transmitter) misc, Use 1 Device Daily. Change transmitter every 90 days (Patient not taking: Reported on 4/28/2025), Disp: 1 each, Rfl: 2    Glucagon 3 MG/DOSE powder, 1 each into the nostril(s) as directed by provider As Needed (prn for severe hypoglycemia). Use prn for severe hypoglycemia. (Patient not taking: Reported on 4/28/2025), Disp: 1 each, Rfl: 1    hydrocortisone 0.5 % cream, apply to affected area twice a day for 7 days (Patient not taking: Reported on 4/28/2025), Disp: , Rfl:       Assessment & Plan   Diabetes mellitus type 1 hyperglycemia: Uncontrolled, has significant hyperglycemia with average blood  sugar is 232  I have changed the basal rate from midnight to midnight at 0.70 units/h.  Rest of the pump settings will stay as it is.  She is advised to always keep glucose source and Glucagon Emergency Kit in case of low blood sugars.    Hypothyroidism: She is not taking any thyroid medications at this time.  Will follow labs.    Diabetic neuropathy: On vitamin D and B12 supplementation.    Assessment and plan from September 17, 2024 reviewed and updated.            Landon De Luna MD FACE.

## 2025-04-28 NOTE — PATIENT INSTRUCTIONS
Please watch for low blood sugar since I have changed your insulin pump settings and always keep glucose source in case of low blood sugars  Always keep Glucagon Emergency Kit handy  Labs fasting in the next few days  Follow-up in 6 months.

## 2025-05-20 ENCOUNTER — TELEPHONE (OUTPATIENT)
Dept: ENDOCRINOLOGY | Facility: CLINIC | Age: 30
End: 2025-05-20
Payer: MEDICAID

## 2025-05-20 NOTE — TELEPHONE ENCOUNTER
Pt called and wanted provider to review labs pt states her kidneys and back are hurting, please advise.

## 2025-05-22 DIAGNOSIS — E10.65 TYPE 1 DIABETES MELLITUS WITH HYPERGLYCEMIA: Primary | ICD-10-CM

## 2025-06-16 RX ORDER — INSULIN PMP CART,AUT,G6/7,CNTR
1 EACH SUBCUTANEOUS DAILY
Qty: 10 EACH | Refills: 6 | Status: SHIPPED | OUTPATIENT
Start: 2025-06-16

## 2025-08-11 RX ORDER — LISINOPRIL 2.5 MG/1
2.5 TABLET ORAL DAILY
Qty: 30 TABLET | Refills: 11 | Status: SHIPPED | OUTPATIENT
Start: 2025-08-11